# Patient Record
Sex: FEMALE | Race: WHITE | HISPANIC OR LATINO | Employment: UNEMPLOYED | URBAN - METROPOLITAN AREA
[De-identification: names, ages, dates, MRNs, and addresses within clinical notes are randomized per-mention and may not be internally consistent; named-entity substitution may affect disease eponyms.]

---

## 2018-05-24 ENCOUNTER — NON-PROVIDER VISIT (OUTPATIENT)
Dept: OBGYN | Facility: CLINIC | Age: 33
End: 2018-05-24
Payer: COMMERCIAL

## 2018-05-24 DIAGNOSIS — Z32.01 PREGNANCY EXAMINATION OR TEST, POSITIVE RESULT: ICD-10-CM

## 2018-05-24 LAB
INT CON NEG: NEGATIVE
INT CON POS: POSITIVE
POC URINE PREGNANCY TEST: POSITIVE

## 2018-05-24 PROCEDURE — 81025 URINE PREGNANCY TEST: CPT | Performed by: OBSTETRICS & GYNECOLOGY

## 2018-08-21 ENCOUNTER — HOSPITAL ENCOUNTER (OUTPATIENT)
Facility: MEDICAL CENTER | Age: 33
End: 2018-08-21
Attending: OBSTETRICS & GYNECOLOGY
Payer: COMMERCIAL

## 2018-08-21 ENCOUNTER — GYNECOLOGY VISIT (OUTPATIENT)
Dept: OBGYN | Facility: CLINIC | Age: 33
End: 2018-08-21
Payer: COMMERCIAL

## 2018-08-21 VITALS — WEIGHT: 132 LBS | DIASTOLIC BLOOD PRESSURE: 64 MMHG | BODY MASS INDEX: 28.56 KG/M2 | SYSTOLIC BLOOD PRESSURE: 108 MMHG

## 2018-08-21 DIAGNOSIS — Z12.4 SCREENING FOR CERVICAL CANCER: ICD-10-CM

## 2018-08-21 DIAGNOSIS — N93.8 DUB (DYSFUNCTIONAL UTERINE BLEEDING): ICD-10-CM

## 2018-08-21 DIAGNOSIS — Z11.51 SCREENING FOR HPV (HUMAN PAPILLOMAVIRUS): ICD-10-CM

## 2018-08-21 DIAGNOSIS — Z33.1 PREGNANT STATE, INCIDENTAL: ICD-10-CM

## 2018-08-21 PROCEDURE — 99214 OFFICE O/P EST MOD 30 MIN: CPT | Mod: 25 | Performed by: OBSTETRICS & GYNECOLOGY

## 2018-08-21 PROCEDURE — 87624 HPV HI-RISK TYP POOLED RSLT: CPT

## 2018-08-21 PROCEDURE — 87591 N.GONORRHOEAE DNA AMP PROB: CPT

## 2018-08-21 PROCEDURE — 76830 TRANSVAGINAL US NON-OB: CPT | Performed by: OBSTETRICS & GYNECOLOGY

## 2018-08-21 PROCEDURE — 87491 CHLMYD TRACH DNA AMP PROBE: CPT

## 2018-08-21 PROCEDURE — 88175 CYTOPATH C/V AUTO FLUID REDO: CPT

## 2018-08-21 NOTE — PROGRESS NOTES
Chief complaint;  This patient is a 32 y.o. female , Patient's last menstrual period was 2018. presents complaining of dysfunctional uterine bleeding.    History of class AI GDM with first pregnancy and large baby 8 lbs. 9 oz.    Review of systems-denies; chest pain, shortness of breath, fever, chills, abdominal pain  Past OB history-  OB History    Para Term  AB Living   4 2 2 0 1 2   SAB TAB Ectopic Molar Multiple Live Births   1 0 0     2      # Outcome Date GA Lbr Esdras/2nd Weight Sex Delivery Anes PTL Lv   4 Current            3 Term 16 40w4d  2.995 kg (6 lb 9.6 oz) M Vag-Spont None N MARE   2 Term 08/27/15 40w0d  3.175 kg (7 lb) F Vag-Spont   MARE      Birth Comments: GDM Class A1    1 SAB 2014 7w0d             Birth Comments: NO D&C needed        Past surgical history- History reviewed. No pertinent surgical history.  Past medical history-   Past Medical History:   Diagnosis Date   • Abnormal AFP-increased risk of Down's 2015   • Headache(784.0)    • Miscarriage 2014     Allergies- Vancomycin and Pcn [penicillins]  Present medications-   Outpatient Encounter Prescriptions as of 2018   Medication Sig Dispense Refill   • norethindrone (MICRONOR) 0.35 MG tablet Take 1 Tab by mouth every day. 28 Tab 11   • ibuprofen (MOTRIN) 800 MG Tab Take 1 Tab by mouth every 8 hours as needed (Cramping). 30 Tab 3   • Prenatal MV-Min-Fe Fum-FA-DHA (PRENATAL 1 PO) Take  by mouth.       No facility-administered encounter medications on file as of 2018.      Family history- no history of breast or ovarian cancer  Social history-   Social History     Social History   • Marital status:      Spouse name: N/A   • Number of children: N/A   • Years of education: N/A     Occupational History   • Not on file.     Social History Main Topics   • Smoking status: Never Smoker   • Smokeless tobacco: Not on file   • Alcohol use No   • Drug use: No   • Sexual activity: Yes     Partners:  Male      Comment: Planned pregnancy     Other Topics Concern   • Not on file     Social History Narrative    ** Merged History Encounter **              Physical examination;   Alert and oriented x3  General-well-developed well-nourished female in no apparent distress  Vitals:    08/21/18 1412   BP: 108/64   Weight: 59.9 kg (132 lb)     Skin is warm and dry   HEENT-normocephalic,nontraumatic,PERRLA,EOMI  Throat- no edema or erythema  Neck-supple  Cardiovascular-regular rate and rhythm, normal S1-S2 without murmurs or gallops  Lungs-clear to auscultation bilaterally  Back-negative CVA tenderness  Abdomen-nondistended positive bowel sounds soft nontender without masses or hepatosplenomegaly  Pelvic examination;  External genitalia-without lesions  Vagina-no blood, no discharge  Cervix-closed,no gross lesions  Uterus-  18 weeks size, nontender  Adnexa- without mass or tenderness  Extremities-without cyanosis clubbing or edema  Neurologic-grossly intact    Transvaginal ultrasound; as performed and read by myself; intrauterine gestational sac containing burden pregnancy composite measurements consistent with 18 weeks zero days, EDC 1/22/19 no free pelvic fluid, no adnexal masses    Impression;  Dysfunctional uterine bleeding-no evidence of ectopic or miscarriage    Plan;   SS- too late  Labs today   Patient to followup 4 weeks  Check PN labs AFP one hour GTT      35 Minutes total spent with the patient in face-to-face contact,5 minutes of total time utilized to perform  Ultrasound, greater than 50% of the time has been spent on counseling and coordination of care. Many questions answered regarding possible miscarriage.

## 2018-08-21 NOTE — PROGRESS NOTES
New OB pt, 1st prenatal visit. Pt doing Good, denies any leaking fluids. Pt suffers mild Anxiety.

## 2018-08-21 NOTE — PROGRESS NOTES
Chief complaint;  This patient is a 32 y.o. female , Patient's last menstrual period was 2018. presents complaining of dysfunctional uterine bleeding.    Review of systems-denies; chest pain, shortness of breath, fever, chills, abdominal pain  Past OB history-  OB History    Para Term  AB Living   4 2 2 0 1 2   SAB TAB Ectopic Molar Multiple Live Births   1 0 0     2      # Outcome Date GA Lbr Esdras/2nd Weight Sex Delivery Anes PTL Lv   4 Current            3 Term 16 40w4d  2.995 kg (6 lb 9.6 oz) M Vag-Spont None N MARE   2 Term 08/27/15 40w0d  3.175 kg (7 lb) F Vag-Spont   MARE      Birth Comments: GDM Class A1    1 SAB 2014 7w0d             Birth Comments: NO D&C needed        Past surgical history- History reviewed. No pertinent surgical history.  Past medical history-   Past Medical History:   Diagnosis Date   • Abnormal AFP-increased risk of Down's 2015   • Headache(784.0)    • Miscarriage 2014     Allergies- Vancomycin and Pcn [penicillins]  Present medications-   Outpatient Encounter Prescriptions as of 2018   Medication Sig Dispense Refill   • norethindrone (MICRONOR) 0.35 MG tablet Take 1 Tab by mouth every day. 28 Tab 11   • ibuprofen (MOTRIN) 800 MG Tab Take 1 Tab by mouth every 8 hours as needed (Cramping). 30 Tab 3   • Prenatal MV-Min-Fe Fum-FA-DHA (PRENATAL 1 PO) Take  by mouth.       No facility-administered encounter medications on file as of 2018.      Family history- no history of breast or ovarian cancer  Social history-   Social History     Social History   • Marital status:      Spouse name: N/A   • Number of children: N/A   • Years of education: N/A     Occupational History   • Not on file.     Social History Main Topics   • Smoking status: Never Smoker   • Smokeless tobacco: Not on file   • Alcohol use No   • Drug use: No   • Sexual activity: Yes     Partners: Male      Comment: Planned pregnancy     Other Topics Concern   • Not on file      Social History Narrative    ** Merged History Encounter **              Physical examination;   Alert and oriented x3  General-well-developed well-nourished female in no apparent distress  There were no vitals filed for this visit.  Skin is warm and dry   HEENT-normocephalic,nontraumatic,PERRLA,EOMI  Throat- no edema or erythema  Neck-supple  Cardiovascular-regular rate and rhythm, normal S1-S2 without murmurs or gallops  Lungs-clear to auscultation bilaterally  Back-negative CVA tenderness  Abdomen-nondistended positive bowel sounds soft nontender without masses or hepatosplenomegaly  Pelvic examination;  External genitalia-without lesions  Vagina-no blood, no discharge  Cervix-closed,no gross lesions  Uterus-  *** weeks size, nontender  Adnexa- without mass or tenderness  Extremities-without cyanosis clubbing or edema  Neurologic-grossly intact    Transvaginal ultrasound; as performed and read by myself; ***    Impression;  Dysfunctional uterine bleeding- ***    Plan;   SS- ***  Labs today   Patient to followup ***      *** Minutes total spent with the patient in face-to-face contact,5 minutes of total time utilized to perform  Ultrasound, greater than 50% of the time has been spent on counseling and coordination of care. Many questions answered regarding possible miscarriage.

## 2018-08-22 LAB
C TRACH DNA GENITAL QL NAA+PROBE: NEGATIVE
CYTOLOGY REG CYTOL: NORMAL
HPV HR 12 DNA CVX QL NAA+PROBE: NEGATIVE
HPV16 DNA SPEC QL NAA+PROBE: NEGATIVE
HPV18 DNA SPEC QL NAA+PROBE: NEGATIVE
N GONORRHOEA DNA GENITAL QL NAA+PROBE: NEGATIVE
SPECIMEN SOURCE: NORMAL
SPECIMEN SOURCE: NORMAL

## 2018-10-02 ENCOUNTER — HOSPITAL ENCOUNTER (OUTPATIENT)
Dept: LAB | Facility: MEDICAL CENTER | Age: 33
End: 2018-10-02
Attending: OBSTETRICS & GYNECOLOGY
Payer: COMMERCIAL

## 2018-10-02 DIAGNOSIS — N93.8 DUB (DYSFUNCTIONAL UTERINE BLEEDING): ICD-10-CM

## 2018-10-02 LAB
ABO GROUP BLD: NORMAL
BACTERIA #/AREA URNS HPF: ABNORMAL /HPF
BLD GP AB SCN SERPL QL: NORMAL
EPI CELLS #/AREA URNS HPF: ABNORMAL /HPF
HYALINE CASTS #/AREA URNS LPF: ABNORMAL /LPF
RBC # URNS HPF: ABNORMAL /HPF
RH BLD: NORMAL
WBC #/AREA URNS HPF: ABNORMAL /HPF

## 2018-10-02 PROCEDURE — 87389 HIV-1 AG W/HIV-1&-2 AB AG IA: CPT

## 2018-10-02 PROCEDURE — 86900 BLOOD TYPING SEROLOGIC ABO: CPT

## 2018-10-02 PROCEDURE — 81511 FTL CGEN ABNOR FOUR ANAL: CPT

## 2018-10-02 PROCEDURE — 86762 RUBELLA ANTIBODY: CPT

## 2018-10-02 PROCEDURE — 85025 COMPLETE CBC W/AUTO DIFF WBC: CPT

## 2018-10-02 PROCEDURE — 86850 RBC ANTIBODY SCREEN: CPT

## 2018-10-02 PROCEDURE — 86901 BLOOD TYPING SEROLOGIC RH(D): CPT

## 2018-10-02 PROCEDURE — 36415 COLL VENOUS BLD VENIPUNCTURE: CPT

## 2018-10-02 PROCEDURE — 87340 HEPATITIS B SURFACE AG IA: CPT

## 2018-10-02 PROCEDURE — 86780 TREPONEMA PALLIDUM: CPT

## 2018-10-02 PROCEDURE — 81001 URINALYSIS AUTO W/SCOPE: CPT

## 2018-10-03 LAB
APPEARANCE UR: ABNORMAL
BASOPHILS # BLD AUTO: 0.3 % (ref 0–1.8)
BASOPHILS # BLD: 0.03 K/UL (ref 0–0.12)
BILIRUB UR QL STRIP.AUTO: NEGATIVE
COLOR UR: YELLOW
EOSINOPHIL # BLD AUTO: 0.03 K/UL (ref 0–0.51)
EOSINOPHIL NFR BLD: 0.3 % (ref 0–6.9)
ERYTHROCYTE [DISTWIDTH] IN BLOOD BY AUTOMATED COUNT: 45.3 FL (ref 35.9–50)
GLUCOSE UR STRIP.AUTO-MCNC: NEGATIVE MG/DL
HBV SURFACE AG SER QL: NEGATIVE
HCT VFR BLD AUTO: 39.5 % (ref 37–47)
HGB BLD-MCNC: 13.1 G/DL (ref 12–16)
HIV 1+2 AB+HIV1 P24 AG SERPL QL IA: NON REACTIVE
IMM GRANULOCYTES # BLD AUTO: 0.05 K/UL (ref 0–0.11)
IMM GRANULOCYTES NFR BLD AUTO: 0.6 % (ref 0–0.9)
KETONES UR STRIP.AUTO-MCNC: 15 MG/DL
LEUKOCYTE ESTERASE UR QL STRIP.AUTO: ABNORMAL
LYMPHOCYTES # BLD AUTO: 1.99 K/UL (ref 1–4.8)
LYMPHOCYTES NFR BLD: 22.2 % (ref 22–41)
MCH RBC QN AUTO: 32.5 PG (ref 27–33)
MCHC RBC AUTO-ENTMCNC: 33.2 G/DL (ref 33.6–35)
MCV RBC AUTO: 98 FL (ref 81.4–97.8)
MICRO URNS: ABNORMAL
MONOCYTES # BLD AUTO: 0.67 K/UL (ref 0–0.85)
MONOCYTES NFR BLD AUTO: 7.5 % (ref 0–13.4)
NEUTROPHILS # BLD AUTO: 6.19 K/UL (ref 2–7.15)
NEUTROPHILS NFR BLD: 69.1 % (ref 44–72)
NITRITE UR QL STRIP.AUTO: NEGATIVE
NRBC # BLD AUTO: 0 K/UL
NRBC BLD-RTO: 0 /100 WBC
PH UR STRIP.AUTO: 6.5 [PH]
PLATELET # BLD AUTO: 241 K/UL (ref 164–446)
PMV BLD AUTO: 10.4 FL (ref 9–12.9)
PROT UR QL STRIP: NEGATIVE MG/DL
RBC # BLD AUTO: 4.03 M/UL (ref 4.2–5.4)
RBC UR QL AUTO: NEGATIVE
RUBV AB SER QL: 461.5 IU/ML
SP GR UR STRIP.AUTO: 1.01
TREPONEMA PALLIDUM IGG+IGM AB [PRESENCE] IN SERUM OR PLASMA BY IMMUNOASSAY: NON REACTIVE
UROBILINOGEN UR STRIP.AUTO-MCNC: 0.2 MG/DL
WBC # BLD AUTO: 9 K/UL (ref 4.8–10.8)

## 2018-10-04 LAB
# FETUSES US: NORMAL
AFP MOM SERPL: 2.04
AFP SERPL-MCNC: 244 NG/ML
AGE - REPORTED: 33.2 YR
CURRENT SMOKER: NO
FAMILY MEMBER DISEASES HX: NO
GA METHOD: NORMAL
GA: NORMAL WK
HCG MOM SERPL: 0.58
HCG SERPL-ACNC: NORMAL IU/L
HX OF HEREDITARY DISORDERS: NO
IDDM PATIENT QL: NO
INHIBIN A MOM SERPL: 0.62
INHIBIN A SERPL-MCNC: 188 PG/ML
INTEGRATED SCN PATIENT-IMP: NORMAL
PATHOLOGY STUDY: NORMAL
SPECIMEN DRAWN SERPL: NORMAL
U ESTRIOL MOM SERPL: 1.25
U ESTRIOL SERPL-MCNC: 4.52 NG/ML

## 2018-10-12 ENCOUNTER — INITIAL PRENATAL (OUTPATIENT)
Dept: OBGYN | Facility: MEDICAL CENTER | Age: 33
End: 2018-10-12
Payer: COMMERCIAL

## 2018-10-12 VITALS
DIASTOLIC BLOOD PRESSURE: 60 MMHG | SYSTOLIC BLOOD PRESSURE: 100 MMHG | BODY MASS INDEX: 30.2 KG/M2 | WEIGHT: 140 LBS | HEIGHT: 57 IN

## 2018-10-12 DIAGNOSIS — Z34.82 ENCOUNTER FOR SUPERVISION OF OTHER NORMAL PREGNANCY IN SECOND TRIMESTER: ICD-10-CM

## 2018-10-12 PROBLEM — Z34.92 SUPERVISION OF NORMAL PREGNANCY IN SECOND TRIMESTER: Status: ACTIVE | Noted: 2018-10-12

## 2018-10-12 PROCEDURE — 90040 PR PRENATAL FOLLOW UP: CPT | Performed by: OBSTETRICS & GYNECOLOGY

## 2018-10-12 NOTE — PROGRESS NOTES
Pt. Here for NOB visit today.  # [phone]  Pt. States no complaints  Pharmacy verified  AFP 10/2018 WNL

## 2018-10-12 NOTE — PROGRESS NOTES
32 y.o.  25w6d The patient is here for routine obstetrical followup. She reports good fetal movement. She denies contractions, vaginal bleeding, or leaking of fluid.    The patient's pregnancy is complicated by   Patient Active Problem List    Diagnosis Date Noted   • Postpartum care following vaginal delivery 2016     Prenatal labs, pap, afp reviewed - normal  Fetal survey ordered.  28 wk labs ordered.      Followup in 4 weeks   labor precautions were discussed with patient  Fetal kick counts were discussed with patient

## 2018-10-30 ENCOUNTER — HOSPITAL ENCOUNTER (OUTPATIENT)
Dept: RADIOLOGY | Facility: MEDICAL CENTER | Age: 33
End: 2018-10-30
Attending: OBSTETRICS & GYNECOLOGY
Payer: COMMERCIAL

## 2018-10-30 ENCOUNTER — HOSPITAL ENCOUNTER (OUTPATIENT)
Dept: LAB | Facility: MEDICAL CENTER | Age: 33
End: 2018-10-30
Attending: OBSTETRICS & GYNECOLOGY
Payer: COMMERCIAL

## 2018-10-30 DIAGNOSIS — Z34.82 ENCOUNTER FOR SUPERVISION OF OTHER NORMAL PREGNANCY IN SECOND TRIMESTER: ICD-10-CM

## 2018-10-30 LAB
BASOPHILS # BLD AUTO: 0.3 % (ref 0–1.8)
BASOPHILS # BLD: 0.02 K/UL (ref 0–0.12)
EOSINOPHIL # BLD AUTO: 0.02 K/UL (ref 0–0.51)
EOSINOPHIL NFR BLD: 0.3 % (ref 0–6.9)
ERYTHROCYTE [DISTWIDTH] IN BLOOD BY AUTOMATED COUNT: 45.6 FL (ref 35.9–50)
GLUCOSE 1H P 50 G GLC PO SERPL-MCNC: 174 MG/DL (ref 70–139)
HCT VFR BLD AUTO: 36.4 % (ref 37–47)
HGB BLD-MCNC: 12.1 G/DL (ref 12–16)
IMM GRANULOCYTES # BLD AUTO: 0.03 K/UL (ref 0–0.11)
IMM GRANULOCYTES NFR BLD AUTO: 0.4 % (ref 0–0.9)
LYMPHOCYTES # BLD AUTO: 1.41 K/UL (ref 1–4.8)
LYMPHOCYTES NFR BLD: 18.3 % (ref 22–41)
MCH RBC QN AUTO: 32.7 PG (ref 27–33)
MCHC RBC AUTO-ENTMCNC: 33.2 G/DL (ref 33.6–35)
MCV RBC AUTO: 98.4 FL (ref 81.4–97.8)
MONOCYTES # BLD AUTO: 0.54 K/UL (ref 0–0.85)
MONOCYTES NFR BLD AUTO: 7 % (ref 0–13.4)
NEUTROPHILS # BLD AUTO: 5.7 K/UL (ref 2–7.15)
NEUTROPHILS NFR BLD: 73.7 % (ref 44–72)
NRBC # BLD AUTO: 0 K/UL
NRBC BLD-RTO: 0 /100 WBC
PLATELET # BLD AUTO: 219 K/UL (ref 164–446)
PMV BLD AUTO: 10.3 FL (ref 9–12.9)
RBC # BLD AUTO: 3.7 M/UL (ref 4.2–5.4)
WBC # BLD AUTO: 7.7 K/UL (ref 4.8–10.8)

## 2018-10-30 PROCEDURE — 82950 GLUCOSE TEST: CPT

## 2018-10-30 PROCEDURE — 36415 COLL VENOUS BLD VENIPUNCTURE: CPT

## 2018-10-30 PROCEDURE — 86780 TREPONEMA PALLIDUM: CPT

## 2018-10-30 PROCEDURE — 76805 OB US >/= 14 WKS SNGL FETUS: CPT

## 2018-10-30 PROCEDURE — 85025 COMPLETE CBC W/AUTO DIFF WBC: CPT

## 2018-10-31 DIAGNOSIS — R73.09 GLUCOSE TOLERANCE TEST ABNORMAL: ICD-10-CM

## 2018-10-31 LAB — TREPONEMA PALLIDUM IGG+IGM AB [PRESENCE] IN SERUM OR PLASMA BY IMMUNOASSAY: NON REACTIVE

## 2018-11-01 ENCOUNTER — DATING (OUTPATIENT)
Dept: OBGYN | Facility: MEDICAL CENTER | Age: 33
End: 2018-11-01

## 2018-11-05 ENCOUNTER — TELEPHONE (OUTPATIENT)
Dept: OBGYN | Facility: CLINIC | Age: 33
End: 2018-11-05

## 2018-11-05 NOTE — TELEPHONE ENCOUNTER
----- Message from Alexia Cagle M.D. sent at 10/31/2018  8:33 AM PDT -----  Please inform patient that her glucose test returned abnormal.  I have placed an order for a 3 hour GTT.  Please have patient complete this test asap.  She will need to be fasting.

## 2018-11-09 ENCOUNTER — ROUTINE PRENATAL (OUTPATIENT)
Dept: OBGYN | Facility: CLINIC | Age: 33
End: 2018-11-09
Payer: COMMERCIAL

## 2018-11-09 VITALS — DIASTOLIC BLOOD PRESSURE: 60 MMHG | BODY MASS INDEX: 30.25 KG/M2 | WEIGHT: 139.8 LBS | SYSTOLIC BLOOD PRESSURE: 110 MMHG

## 2018-11-09 DIAGNOSIS — Z34.83 ENCOUNTER FOR SUPERVISION OF OTHER NORMAL PREGNANCY, THIRD TRIMESTER: ICD-10-CM

## 2018-11-09 DIAGNOSIS — O99.810 ABNORMAL GLUCOSE TOLERANCE IN PREGNANCY: ICD-10-CM

## 2018-11-09 PROCEDURE — 90040 PR PRENATAL FOLLOW UP: CPT | Performed by: OBSTETRICS & GYNECOLOGY

## 2018-11-09 NOTE — LETTER
"Count Your Baby's Movements  Another step to a healthy delivery        How Many Weeks Pregnant? 29w3d  Date to Begin Countin2018              How to use this chart    One way for your physician to keep track of your baby's health is by knowing how often the baby moves (or \"kicks\") in your womb.  You can help your physician to do this by using this chart every day.    Every day, you should see how many hours it takes for your baby to move 10 times.  Start in the morning, as soon as you get up.    · First, write down the time your baby moves until you get to 10.  · Check off one box every time your baby moves until you get to 10.  · Write down the time you finished counting in the last column.  · Total how long it took to count up all 10 movements.  · Finally, fill in the box that shows how long this took.  After counting 10 movements, you no longer have to count any more that day.  The next morning, just start counting again as soon as you get up.    What should you call a \"movement\"?  It is hard to say, because it will feel different from one mother to another and from one pregnancy to the next.  The important thing is that you count the movements the same way throughout your pregnancy.  If you have more questions, you should ask your physician.    Count carefully every day!  SAMPLE:  Week 28    How many hours did it take to feel 10 movements?       Start  Time     1     2     3     4     5     6     7     8     9     10   Finish Time   Mon 8:20 ·  ·  ·  ·  ·  ·  ·  ·  ·  ·  11:40                  Fri               Sat               Sun                 IMPORTANT: You should contact your physician if it takes more than two hours for you to feel 10 movements.  Each morning, write down the time and start to count the movements of your baby.  Keep track by checking off one box every time you feel one movement.  When you have felt 10 \"kicks\", write down the time you " finished counting in the last column.  Then fill in the   box (over the check laith) for the number of hours it took.  Be sure to read the complete instructions on the previous page.

## 2018-11-09 NOTE — NON-PROVIDER
Patient here today for OB follow up @ 75rmg8l  States good FM, denies VB and LOF.  C/O: None  Abn 1 hr gtt, needs 3 hr  Pharm verified   Good # [phone]

## 2018-11-09 NOTE — PROGRESS NOTES
S: Pt presents for routine OB follow up. Reports Good fetal movements and kick counts.  No contractions, vaginal bleeding, or leakage of fluid.    Questions answered.    O: /60   Wt 63.4 kg (139 lb 12.8 oz)   LMP 2018   BMI 30.25 kg/m²   Patients' weight gain, fluid intake and exercise level discussed.  Vitals, fundal height , fetal position, and FHR reviewed on flowsheet    Lab:  Recent Results (from the past 336 hour(s))   GLUCOSE 1HR GESTATIONAL    Collection Time: 10/30/18  9:50 AM   Result Value Ref Range    Glucose, Post Dose 174 (H) 70 - 139 mg/dL   CBC WITH DIFFERENTIAL    Collection Time: 10/30/18  9:50 AM   Result Value Ref Range    WBC 7.7 4.8 - 10.8 K/uL    RBC 3.70 (L) 4.20 - 5.40 M/uL    Hemoglobin 12.1 12.0 - 16.0 g/dL    Hematocrit 36.4 (L) 37.0 - 47.0 %    MCV 98.4 (H) 81.4 - 97.8 fL    MCH 32.7 27.0 - 33.0 pg    MCHC 33.2 (L) 33.6 - 35.0 g/dL    RDW 45.6 35.9 - 50.0 fL    Platelet Count 219 164 - 446 K/uL    MPV 10.3 9.0 - 12.9 fL    Neutrophils-Polys 73.70 (H) 44.00 - 72.00 %    Lymphocytes 18.30 (L) 22.00 - 41.00 %    Monocytes 7.00 0.00 - 13.40 %    Eosinophils 0.30 0.00 - 6.90 %    Basophils 0.30 0.00 - 1.80 %    Immature Granulocytes 0.40 0.00 - 0.90 %    Nucleated RBC 0.00 /100 WBC    Neutrophils (Absolute) 5.70 2.00 - 7.15 K/uL    Lymphs (Absolute) 1.41 1.00 - 4.80 K/uL    Monos (Absolute) 0.54 0.00 - 0.85 K/uL    Eos (Absolute) 0.02 0.00 - 0.51 K/uL    Baso (Absolute) 0.02 0.00 - 0.12 K/uL    Immature Granulocytes (abs) 0.03 0.00 - 0.11 K/uL    NRBC (Absolute) 0.00 K/uL   T.PALLIDUM AB EIA    Collection Time: 10/30/18  9:50 AM   Result Value Ref Range    Syphilis, Treponemal Qual Non Reactive Non Reactive       A/P:  32 y.o.  at 29w6d presents for routine obstetric follow-up.  Size equals dates.  Prenatal labs were reviewed.  Abnormal 1 hour GCT was discussed the need for 3-hour GTT was reviewed.  3-hour GTT was ordered    1.  Continue prenatal vitamins.  2.  Continue  daily fetal kick counts.  3.  Exercise at least 30 minutes daily.  4.  Increase water intake.  5.  Pregnancy and  labor precautions were reviewed  6.  Follow-up in 2 weeks.  7.  Precautions and plan of care were discussed

## 2018-11-29 ENCOUNTER — APPOINTMENT (OUTPATIENT)
Dept: OBGYN | Facility: CLINIC | Age: 33
End: 2018-11-29

## 2018-12-07 ENCOUNTER — HOSPITAL ENCOUNTER (OUTPATIENT)
Dept: LAB | Facility: MEDICAL CENTER | Age: 33
End: 2018-12-07
Attending: OBSTETRICS & GYNECOLOGY
Payer: COMMERCIAL

## 2018-12-07 DIAGNOSIS — R73.09 GLUCOSE TOLERANCE TEST ABNORMAL: ICD-10-CM

## 2018-12-07 LAB
GLUCOSE 1H P CHAL SERPL-MCNC: 190 MG/DL (ref 65–180)
GLUCOSE 2H P CHAL SERPL-MCNC: 191 MG/DL (ref 65–155)
GLUCOSE 3H P CHAL SERPL-MCNC: 150 MG/DL (ref 65–140)
GLUCOSE BS SERPL-MCNC: 67 MG/DL (ref 65–95)

## 2018-12-07 PROCEDURE — 82951 GLUCOSE TOLERANCE TEST (GTT): CPT

## 2018-12-07 PROCEDURE — 82952 GTT-ADDED SAMPLES: CPT

## 2018-12-07 PROCEDURE — 36415 COLL VENOUS BLD VENIPUNCTURE: CPT

## 2018-12-10 DIAGNOSIS — O24.419 GESTATIONAL DIABETES MELLITUS (GDM) IN THIRD TRIMESTER, GESTATIONAL DIABETES METHOD OF CONTROL UNSPECIFIED: ICD-10-CM

## 2018-12-11 ENCOUNTER — ROUTINE PRENATAL (OUTPATIENT)
Dept: OBGYN | Facility: CLINIC | Age: 33
End: 2018-12-11
Payer: COMMERCIAL

## 2018-12-11 VITALS — DIASTOLIC BLOOD PRESSURE: 66 MMHG | WEIGHT: 140 LBS | BODY MASS INDEX: 30.3 KG/M2 | SYSTOLIC BLOOD PRESSURE: 114 MMHG

## 2018-12-11 DIAGNOSIS — O24.419 GESTATIONAL DIABETES MELLITUS (GDM), ANTEPARTUM, GESTATIONAL DIABETES METHOD OF CONTROL UNSPECIFIED: ICD-10-CM

## 2018-12-11 DIAGNOSIS — Z34.83 ENCOUNTER FOR SUPERVISION OF OTHER NORMAL PREGNANCY, THIRD TRIMESTER: ICD-10-CM

## 2018-12-11 PROCEDURE — 90040 PR PRENATAL FOLLOW UP: CPT | Performed by: OBSTETRICS & GYNECOLOGY

## 2018-12-11 NOTE — PROGRESS NOTES
S: Pt presents for routine OB follow up. Reports Good fetal movements/normal kick counts.  No contractions, vaginal bleeding, or leakage of fluid.    Questions answered.    O: /66   Wt 63.5 kg (140 lb)   LMP 2018   BMI 30.30 kg/m²   Patients' weight gain, fluid intake and exercise level discussed.  Vitals, fundal height , fetal position, and FHR reviewed on flowsheet    Lab:  Recent Results (from the past 336 hour(s))   GLUCOSE 3 HR GESTATIONAL    Collection Time: 18  6:46 AM   Result Value Ref Range    Glucose 3 Hour 150 (H) 65 - 140 mg/dL    Glucose 2 Hour 191 (H) 65 - 155 mg/dL    Baseline Glucose 67 65 - 95 mg/dL    Glucose 1 Hour 190 (H) 65 - 180 mg/dL       A/P:  33 y.o.  at 34w3d presents for routine obstetric follow-up.  Size equals dates   Patient is a new gestational diabetic.  Results were reviewed.  Counseled on diet.  Diabetic education appointment is pending    1.  Continue prenatal vitamins.  2.  Fetal kick counts daily discussed.  3.  Exercise at least 30 minutes daily.  4.  Drink at least 2L of water daily  5.  Pregnancy and  labor precautions reviewed  6.  Follow-up in 1 weeks.  7.  GBS at follow-up visit discussed

## 2018-12-11 NOTE — PROGRESS NOTES
Ob follow up. Good fetal movement. Pt has no complaints.   Phone # & pharmacy verified.  TDAP NEXT VISIT

## 2018-12-14 ENCOUNTER — TELEPHONE (OUTPATIENT)
Dept: OBGYN | Facility: MEDICAL CENTER | Age: 33
End: 2018-12-14

## 2018-12-14 NOTE — TELEPHONE ENCOUNTER
----- Message from Alexia Cagle M.D. sent at 12/10/2018  9:31 AM PST -----  Please inform patient that she failed her 3 hour GTT.  I have placed a referral for diabetic education ASAP.

## 2018-12-14 NOTE — TELEPHONE ENCOUNTER
Left detailed message for pt with elevated 3hr gtt results.Advised her of referral to [facility] for diet teaching and glucomoter testing.L/m to return tc with any questions.emily

## 2018-12-26 ENCOUNTER — HOSPITAL ENCOUNTER (OUTPATIENT)
Facility: MEDICAL CENTER | Age: 33
End: 2018-12-26
Attending: OBSTETRICS & GYNECOLOGY
Payer: COMMERCIAL

## 2018-12-26 ENCOUNTER — ROUTINE PRENATAL (OUTPATIENT)
Dept: OBGYN | Facility: CLINIC | Age: 33
End: 2018-12-26
Payer: COMMERCIAL

## 2018-12-26 VITALS — WEIGHT: 141 LBS | BODY MASS INDEX: 30.51 KG/M2 | SYSTOLIC BLOOD PRESSURE: 112 MMHG | DIASTOLIC BLOOD PRESSURE: 66 MMHG

## 2018-12-26 DIAGNOSIS — O24.410 DIET CONTROLLED GESTATIONAL DIABETES MELLITUS (GDM), ANTEPARTUM: ICD-10-CM

## 2018-12-26 DIAGNOSIS — O09.93 HIGH-RISK PREGNANCY SUPERVISION, THIRD TRIMESTER: ICD-10-CM

## 2018-12-26 DIAGNOSIS — O09.93 HIGH-RISK PREGNANCY SUPERVISION, THIRD TRIMESTER: Primary | ICD-10-CM

## 2018-12-26 PROCEDURE — 87081 CULTURE SCREEN ONLY: CPT

## 2018-12-26 PROCEDURE — 87150 DNA/RNA AMPLIFIED PROBE: CPT

## 2018-12-26 PROCEDURE — 90040 PR PRENATAL FOLLOW UP: CPT | Performed by: OBSTETRICS & GYNECOLOGY

## 2018-12-26 NOTE — PROGRESS NOTES
S: Pt presents for routine OB follow up. Reports good fetal movements.  No contractions, vaginal bleeding, or leakage of fluid.  Patient is a new gestational diabetic.  Patient states she was called for diabetes teaching but did not go for her appointment.  I discussed need for diabetes teaching to start fingersticks 4 times daily.  I discussed need for compliance with care and risk of uncontrolled diabetes including risk for stillbirth.  Questions answered.    O: LMP 2018   Patients' weight gain, fluid intake and exercise level discussed.  Vitals, fundal height , fetal position, and FHR reviewed on flowsheet    Lab:No results found for this or any previous visit (from the past 336 hour(s)).    A/P:  33 y.o.  at 36w4d presents for routine obstetric follow-up.  Size equals dates   Encounter Diagnoses   Name Primary?   • High-risk pregnancy supervision, third trimester    • Diet controlled gestational diabetes mellitus (GDM), antepartum          1.  Continue prenatal vitamins.  2.  Fetal kick counts daily discussed.  3.  Exercise at least 30 minutes daily.  4.  Drink at least 2L of water daily  5. Pregnancy and  Labor precautions reviewed.  6.  Follow-up in 1 week.  7.  GBS done today  8.  Patient encouraged to go for diabetes teaching and start 4 times daily fingersticks  9.  Precautions and plan of care were reviewed

## 2018-12-27 LAB — GP B STREP DNA SPEC QL NAA+PROBE: NEGATIVE

## 2019-01-09 ENCOUNTER — ROUTINE PRENATAL (OUTPATIENT)
Dept: OBGYN | Facility: CLINIC | Age: 34
End: 2019-01-09
Payer: COMMERCIAL

## 2019-01-09 VITALS — DIASTOLIC BLOOD PRESSURE: 70 MMHG | WEIGHT: 142 LBS | SYSTOLIC BLOOD PRESSURE: 114 MMHG | BODY MASS INDEX: 30.73 KG/M2

## 2019-01-09 DIAGNOSIS — Z34.83 PRENATAL CARE, SUBSEQUENT PREGNANCY IN THIRD TRIMESTER: ICD-10-CM

## 2019-01-09 DIAGNOSIS — Z34.90 TERM PREGNANCY: ICD-10-CM

## 2019-01-09 PROCEDURE — 90715 TDAP VACCINE 7 YRS/> IM: CPT | Performed by: OBSTETRICS & GYNECOLOGY

## 2019-01-09 PROCEDURE — 90471 IMMUNIZATION ADMIN: CPT | Performed by: OBSTETRICS & GYNECOLOGY

## 2019-01-09 PROCEDURE — 90040 PR PRENATAL FOLLOW UP: CPT | Performed by: OBSTETRICS & GYNECOLOGY

## 2019-01-09 NOTE — PROGRESS NOTES
GDM A1 Ob follow up. Good fetal movement. Pt has no complaints.   Phone # & pharmacy verified.  GBS = Negative

## 2019-01-09 NOTE — PROGRESS NOTES
FLO:  38w4d    Pt reports doing well.  Denies vaginal bleeding, contractions, LOF.  Reports +FM.    CBGS:  Fastings: 62-78  PP:     LMP 2018   gen: AAO, NAD  FHTs: 145  FH: 38  SVE: /3    A/P: 33 y.o.  @ 38w4d by lmp c/w 28wk US   #Prenatal care. PNL up to date, Rh+,  RPR WNL; GBS negative  #tdap given today  # FWB.  anatomy US wnl, FH appropriate, FHT + today, continue kick counts  # Labor precautions discussed  #Delivery plan: await spontaneous labor - IOL request sent  #Postpartum planning.  plans to BF  #RTC 1 wk

## 2019-01-10 ENCOUNTER — TELEPHONE (OUTPATIENT)
Dept: OBGYN | Facility: CLINIC | Age: 34
End: 2019-01-10

## 2019-01-10 NOTE — TELEPHONE ENCOUNTER
Pt LM on Vm earlier asking if we were able to move up her IOL to 1/19/19.   Called patient, unable to reach her. TCB    2241 Pt called back, stated doctor had given pt the option of having IOL on 1/19/18, spoke with Marcelle and she will call patient.  Pt notified, verbalized understanding and will wait for Marcelle's call

## 2019-01-17 ENCOUNTER — ROUTINE PRENATAL (OUTPATIENT)
Dept: OBGYN | Facility: CLINIC | Age: 34
End: 2019-01-17
Payer: COMMERCIAL

## 2019-01-17 VITALS — SYSTOLIC BLOOD PRESSURE: 102 MMHG | WEIGHT: 142 LBS | DIASTOLIC BLOOD PRESSURE: 60 MMHG | BODY MASS INDEX: 30.73 KG/M2

## 2019-01-17 DIAGNOSIS — Z34.83 ENCOUNTER FOR SUPERVISION OF OTHER NORMAL PREGNANCY, THIRD TRIMESTER: ICD-10-CM

## 2019-01-17 PROCEDURE — 90040 PR PRENATAL FOLLOW UP: CPT | Performed by: OBSTETRICS & GYNECOLOGY

## 2019-01-17 NOTE — PROGRESS NOTES
Pt here today for OB follow up @ 39w5d  Pt denies vaginal blood or cramps/contrctions  Reports +FM  Good # [phone]  Pharmacy Confirmed.  Pt requested Cervical check

## 2019-01-17 NOTE — PROGRESS NOTES
Diabetic prenatal visit;    39w5d  Patient Active Problem List    Diagnosis Date Noted   • High-risk pregnancy supervision, third trimester 12/26/2018   • Gestational diabetes mellitus (GDM), antepartum 12/11/2018   • Encounter for supervision of other normal pregnancy, third trimester 10/12/2018       The patient presents for prenatal care. She is doing well. Denies contractions leakage of fluid or vaginal bleeding. Having good fetal movement    Vitals:    01/17/19 1005   BP: 102/60           Fasting sugars; under 90  One hour Postprandial sugars; under 130    Size equals dates, normal fetal heart rate      Continue ADA diet      Class A1 GDM-well-controlled    NSTs twice weekly starting at 32 weeks if class AII GDM or worse  Increase fluid hydration to 2 L per day  Discussed proper shoes to be worn her pregnancy  Increase exercise daily walking 30 minutes per day  Delivery by 39 weeks if class AII GDM or worse  Discussed support hose use during pregnancy    Patient requests her induction date moved up to this weekend 3-4 days from now this should be fine request/referral placed with schedulers.    Followup; 1 weeks

## 2019-01-22 ENCOUNTER — APPOINTMENT (OUTPATIENT)
Dept: OBGYN | Facility: MEDICAL CENTER | Age: 34
End: 2019-01-22
Attending: OBSTETRICS & GYNECOLOGY
Payer: COMMERCIAL

## 2019-01-22 ENCOUNTER — HOSPITAL ENCOUNTER (INPATIENT)
Facility: MEDICAL CENTER | Age: 34
LOS: 1 days | End: 2019-01-23
Attending: OBSTETRICS & GYNECOLOGY | Admitting: OBSTETRICS & GYNECOLOGY
Payer: COMMERCIAL

## 2019-01-22 LAB
BASOPHILS # BLD AUTO: 0.3 % (ref 0–1.8)
BASOPHILS # BLD: 0.02 K/UL (ref 0–0.12)
EOSINOPHIL # BLD AUTO: 0.01 K/UL (ref 0–0.51)
EOSINOPHIL NFR BLD: 0.1 % (ref 0–6.9)
ERYTHROCYTE [DISTWIDTH] IN BLOOD BY AUTOMATED COUNT: 45.6 FL (ref 35.9–50)
GLUCOSE BLD-MCNC: 68 MG/DL (ref 65–99)
HCT VFR BLD AUTO: 40 % (ref 37–47)
HGB BLD-MCNC: 13.3 G/DL (ref 12–16)
HOLDING TUBE BB 8507: NORMAL
IMM GRANULOCYTES # BLD AUTO: 0.02 K/UL (ref 0–0.11)
IMM GRANULOCYTES NFR BLD AUTO: 0.3 % (ref 0–0.9)
LYMPHOCYTES # BLD AUTO: 1.4 K/UL (ref 1–4.8)
LYMPHOCYTES NFR BLD: 19.1 % (ref 22–41)
MCH RBC QN AUTO: 32.1 PG (ref 27–33)
MCHC RBC AUTO-ENTMCNC: 33.3 G/DL (ref 33.6–35)
MCV RBC AUTO: 96.6 FL (ref 81.4–97.8)
MONOCYTES # BLD AUTO: 0.59 K/UL (ref 0–0.85)
MONOCYTES NFR BLD AUTO: 8 % (ref 0–13.4)
NEUTROPHILS # BLD AUTO: 5.3 K/UL (ref 2–7.15)
NEUTROPHILS NFR BLD: 72.2 % (ref 44–72)
NRBC # BLD AUTO: 0 K/UL
NRBC BLD-RTO: 0 /100 WBC
PLATELET # BLD AUTO: 202 K/UL (ref 164–446)
PMV BLD AUTO: 10.8 FL (ref 9–12.9)
RBC # BLD AUTO: 4.14 M/UL (ref 4.2–5.4)
WBC # BLD AUTO: 7.3 K/UL (ref 4.8–10.8)

## 2019-01-22 PROCEDURE — 10907ZC DRAINAGE OF AMNIOTIC FLUID, THERAPEUTIC FROM PRODUCTS OF CONCEPTION, VIA NATURAL OR ARTIFICIAL OPENING: ICD-10-PCS | Performed by: OBSTETRICS & GYNECOLOGY

## 2019-01-22 PROCEDURE — 700111 HCHG RX REV CODE 636 W/ 250 OVERRIDE (IP)

## 2019-01-22 PROCEDURE — A9270 NON-COVERED ITEM OR SERVICE: HCPCS | Performed by: OBSTETRICS & GYNECOLOGY

## 2019-01-22 PROCEDURE — 700111 HCHG RX REV CODE 636 W/ 250 OVERRIDE (IP): Performed by: OBSTETRICS & GYNECOLOGY

## 2019-01-22 PROCEDURE — 36415 COLL VENOUS BLD VENIPUNCTURE: CPT

## 2019-01-22 PROCEDURE — 770002 HCHG ROOM/CARE - OB PRIVATE (112)

## 2019-01-22 PROCEDURE — 82962 GLUCOSE BLOOD TEST: CPT

## 2019-01-22 PROCEDURE — 304965 HCHG RECOVERY SERVICES

## 2019-01-22 PROCEDURE — 700102 HCHG RX REV CODE 250 W/ 637 OVERRIDE(OP): Performed by: OBSTETRICS & GYNECOLOGY

## 2019-01-22 PROCEDURE — 59410 OBSTETRICAL CARE: CPT | Performed by: OBSTETRICS & GYNECOLOGY

## 2019-01-22 PROCEDURE — 59409 OBSTETRICAL CARE: CPT

## 2019-01-22 PROCEDURE — 85025 COMPLETE CBC W/AUTO DIFF WBC: CPT

## 2019-01-22 PROCEDURE — 3E033VJ INTRODUCTION OF OTHER HORMONE INTO PERIPHERAL VEIN, PERCUTANEOUS APPROACH: ICD-10-PCS | Performed by: OBSTETRICS & GYNECOLOGY

## 2019-01-22 PROCEDURE — 700105 HCHG RX REV CODE 258

## 2019-01-22 PROCEDURE — 700111 HCHG RX REV CODE 636 W/ 250 OVERRIDE (IP): Performed by: NURSE PRACTITIONER

## 2019-01-22 RX ORDER — IBUPROFEN 600 MG/1
600 TABLET ORAL EVERY 6 HOURS PRN
Status: DISCONTINUED | OUTPATIENT
Start: 2019-01-22 | End: 2019-01-23 | Stop reason: HOSPADM

## 2019-01-22 RX ORDER — HYDROCODONE BITARTRATE AND ACETAMINOPHEN 10; 325 MG/1; MG/1
1 TABLET ORAL EVERY 4 HOURS PRN
Status: DISCONTINUED | OUTPATIENT
Start: 2019-01-22 | End: 2019-01-23 | Stop reason: HOSPADM

## 2019-01-22 RX ORDER — SODIUM CHLORIDE, SODIUM LACTATE, POTASSIUM CHLORIDE, CALCIUM CHLORIDE 600; 310; 30; 20 MG/100ML; MG/100ML; MG/100ML; MG/100ML
INJECTION, SOLUTION INTRAVENOUS CONTINUOUS
Status: DISCONTINUED | OUTPATIENT
Start: 2019-01-22 | End: 2019-01-22

## 2019-01-22 RX ORDER — OXYTOCIN 10 [USP'U]/ML
10 INJECTION, SOLUTION INTRAMUSCULAR; INTRAVENOUS ONCE
Status: COMPLETED | OUTPATIENT
Start: 2019-01-22 | End: 2019-01-22

## 2019-01-22 RX ORDER — ONDANSETRON 2 MG/ML
4 INJECTION INTRAMUSCULAR; INTRAVENOUS EVERY 6 HOURS PRN
Status: DISCONTINUED | OUTPATIENT
Start: 2019-01-22 | End: 2019-01-23 | Stop reason: HOSPADM

## 2019-01-22 RX ORDER — OXYTOCIN 10 [USP'U]/ML
10 INJECTION, SOLUTION INTRAMUSCULAR; INTRAVENOUS
Status: DISCONTINUED | OUTPATIENT
Start: 2019-01-22 | End: 2019-01-22 | Stop reason: HOSPADM

## 2019-01-22 RX ORDER — METOCLOPRAMIDE HYDROCHLORIDE 5 MG/ML
10 INJECTION INTRAMUSCULAR; INTRAVENOUS EVERY 6 HOURS PRN
Status: DISCONTINUED | OUTPATIENT
Start: 2019-01-22 | End: 2019-01-23 | Stop reason: HOSPADM

## 2019-01-22 RX ORDER — CITRIC ACID/SODIUM CITRATE 334-500MG
30 SOLUTION, ORAL ORAL EVERY 6 HOURS PRN
Status: DISCONTINUED | OUTPATIENT
Start: 2019-01-22 | End: 2019-01-22 | Stop reason: HOSPADM

## 2019-01-22 RX ORDER — TERBUTALINE SULFATE 1 MG/ML
0.25 INJECTION, SOLUTION SUBCUTANEOUS PRN
Status: DISCONTINUED | OUTPATIENT
Start: 2019-01-22 | End: 2019-01-22 | Stop reason: HOSPADM

## 2019-01-22 RX ORDER — MISOPROSTOL 200 UG/1
800 TABLET ORAL
Status: DISCONTINUED | OUTPATIENT
Start: 2019-01-22 | End: 2019-01-22 | Stop reason: HOSPADM

## 2019-01-22 RX ORDER — METHYLERGONOVINE MALEATE 0.2 MG/ML
0.2 INJECTION INTRAVENOUS
Status: DISCONTINUED | OUTPATIENT
Start: 2019-01-22 | End: 2019-01-22 | Stop reason: HOSPADM

## 2019-01-22 RX ORDER — VITAMIN A ACETATE, BETA CAROTENE, ASCORBIC ACID, CHOLECALCIFEROL, .ALPHA.-TOCOPHEROL ACETATE, DL-, THIAMINE MONONITRATE, RIBOFLAVIN, NIACINAMIDE, PYRIDOXINE HYDROCHLORIDE, FOLIC ACID, CYANOCOBALAMIN, CALCIUM CARBONATE, FERROUS FUMARATE, ZINC OXIDE, CUPRIC OXIDE 3080; 12; 120; 400; 1; 1.84; 3; 20; 22; 920; 25; 200; 27; 10; 2 [IU]/1; UG/1; MG/1; [IU]/1; MG/1; MG/1; MG/1; MG/1; MG/1; [IU]/1; MG/1; MG/1; MG/1; MG/1; MG/1
1 TABLET, FILM COATED ORAL EVERY MORNING
Status: DISCONTINUED | OUTPATIENT
Start: 2019-01-23 | End: 2019-01-23 | Stop reason: HOSPADM

## 2019-01-22 RX ORDER — ACETAMINOPHEN 325 MG/1
325 TABLET ORAL EVERY 4 HOURS PRN
Status: DISCONTINUED | OUTPATIENT
Start: 2019-01-22 | End: 2019-01-22

## 2019-01-22 RX ORDER — HYDROXYZINE 50 MG/1
50 TABLET, FILM COATED ORAL EVERY 6 HOURS PRN
Status: DISCONTINUED | OUTPATIENT
Start: 2019-01-22 | End: 2019-01-22 | Stop reason: HOSPADM

## 2019-01-22 RX ORDER — HYDROCODONE BITARTRATE AND ACETAMINOPHEN 5; 325 MG/1; MG/1
1 TABLET ORAL EVERY 4 HOURS PRN
Status: DISCONTINUED | OUTPATIENT
Start: 2019-01-22 | End: 2019-01-23 | Stop reason: HOSPADM

## 2019-01-22 RX ORDER — CARBOPROST TROMETHAMINE 250 UG/ML
250 INJECTION, SOLUTION INTRAMUSCULAR
Status: DISCONTINUED | OUTPATIENT
Start: 2019-01-22 | End: 2019-01-22 | Stop reason: HOSPADM

## 2019-01-22 RX ORDER — BISACODYL 10 MG
10 SUPPOSITORY, RECTAL RECTAL PRN
Status: DISCONTINUED | OUTPATIENT
Start: 2019-01-22 | End: 2019-01-23 | Stop reason: HOSPADM

## 2019-01-22 RX ORDER — SODIUM CHLORIDE, SODIUM LACTATE, POTASSIUM CHLORIDE, CALCIUM CHLORIDE 600; 310; 30; 20 MG/100ML; MG/100ML; MG/100ML; MG/100ML
1000 INJECTION, SOLUTION INTRAVENOUS CONTINUOUS
Status: DISCONTINUED | OUTPATIENT
Start: 2019-01-22 | End: 2019-01-23 | Stop reason: HOSPADM

## 2019-01-22 RX ORDER — ONDANSETRON 4 MG/1
4 TABLET, ORALLY DISINTEGRATING ORAL EVERY 6 HOURS PRN
Status: DISCONTINUED | OUTPATIENT
Start: 2019-01-22 | End: 2019-01-23 | Stop reason: HOSPADM

## 2019-01-22 RX ORDER — IBUPROFEN 800 MG/1
800 TABLET ORAL EVERY 8 HOURS PRN
Status: DISCONTINUED | OUTPATIENT
Start: 2019-01-22 | End: 2019-01-22

## 2019-01-22 RX ORDER — ACETAMINOPHEN 325 MG/1
650 TABLET ORAL EVERY 4 HOURS PRN
Status: DISCONTINUED | OUTPATIENT
Start: 2019-01-22 | End: 2019-01-23 | Stop reason: HOSPADM

## 2019-01-22 RX ORDER — DOCUSATE SODIUM 100 MG/1
100 CAPSULE, LIQUID FILLED ORAL 2 TIMES DAILY PRN
Status: DISCONTINUED | OUTPATIENT
Start: 2019-01-22 | End: 2019-01-23 | Stop reason: HOSPADM

## 2019-01-22 RX ORDER — ALUMINA, MAGNESIA, AND SIMETHICONE 2400; 2400; 240 MG/30ML; MG/30ML; MG/30ML
30 SUSPENSION ORAL EVERY 6 HOURS PRN
Status: DISCONTINUED | OUTPATIENT
Start: 2019-01-22 | End: 2019-01-22 | Stop reason: HOSPADM

## 2019-01-22 RX ORDER — SODIUM CHLORIDE, SODIUM LACTATE, POTASSIUM CHLORIDE, CALCIUM CHLORIDE 600; 310; 30; 20 MG/100ML; MG/100ML; MG/100ML; MG/100ML
INJECTION, SOLUTION INTRAVENOUS
Status: COMPLETED
Start: 2019-01-22 | End: 2019-01-22

## 2019-01-22 RX ORDER — SODIUM CHLORIDE, SODIUM LACTATE, POTASSIUM CHLORIDE, CALCIUM CHLORIDE 600; 310; 30; 20 MG/100ML; MG/100ML; MG/100ML; MG/100ML
INJECTION, SOLUTION INTRAVENOUS PRN
Status: DISCONTINUED | OUTPATIENT
Start: 2019-01-22 | End: 2019-01-23 | Stop reason: HOSPADM

## 2019-01-22 RX ADMIN — SODIUM CHLORIDE, POTASSIUM CHLORIDE, SODIUM LACTATE AND CALCIUM CHLORIDE 125 ML: 600; 310; 30; 20 INJECTION, SOLUTION INTRAVENOUS at 11:21

## 2019-01-22 RX ADMIN — SODIUM CHLORIDE, SODIUM LACTATE, POTASSIUM CHLORIDE, CALCIUM CHLORIDE 125 ML: 600; 310; 30; 20 INJECTION, SOLUTION INTRAVENOUS at 11:21

## 2019-01-22 RX ADMIN — Medication 2000 ML/HR: at 18:41

## 2019-01-22 RX ADMIN — OXYTOCIN 10 UNITS: 10 INJECTION, SOLUTION INTRAMUSCULAR; INTRAVENOUS at 21:42

## 2019-01-22 RX ADMIN — Medication 125 ML/HR: at 19:23

## 2019-01-22 RX ADMIN — Medication 2 MILLI-UNITS/MIN: at 11:20

## 2019-01-22 ASSESSMENT — PATIENT HEALTH QUESTIONNAIRE - PHQ9
2. FEELING DOWN, DEPRESSED, IRRITABLE, OR HOPELESS: NOT AT ALL
1. LITTLE INTEREST OR PLEASURE IN DOING THINGS: NOT AT ALL
SUM OF ALL RESPONSES TO PHQ9 QUESTIONS 1 AND 2: 0

## 2019-01-22 ASSESSMENT — LIFESTYLE VARIABLES
ALCOHOL_USE: NO
EVER_SMOKED: NEVER

## 2019-01-22 ASSESSMENT — PAIN SCALES - GENERAL: PAINLEVEL_OUTOF10: 0

## 2019-01-22 NOTE — PROGRESS NOTES
"0910: Patient presents to L&D for IOL. Patient denies LOF, VB, UC's. Patient states + FM. FOB \"Justin\" at bedside. All questions answered at this time.  1110: MD out of C/S at this time. Md updated on patient at this time. Orders placed.   1832: RN calls MD and transition to bedside for delivery.  1838: Delivery of a viable male infant. APGARS 8/9.  1841: Delivery of intact placenta. 3 VC.  1850: Report given to GRECIA Wall RN. Plan of care discussed.   "

## 2019-01-22 NOTE — CARE PLAN
Problem: Safety  Goal: Free from accidental injury  Outcome: PROGRESSING AS EXPECTED  Patient resting in bed with call light in reach. Bed in low position. Progressing as expected.     Problem: Risk for Infection, Impaired Wound Healing  Goal: Remain free from signs and symptoms of infection  Outcome: PROGRESSING AS EXPECTED  No S/S of infection present at this time. Will continue to monitor. Progressing as expected.

## 2019-01-22 NOTE — H&P
History and Physical      Aide England is a 33 y.o. year old female  at 40w3d who presents for IOL secondary to post-dates and GDMA1.      Subjective:   negative  For CTXS.   negative Feels pain   negative for LOF  negative for vaginal bleeding.   positive for fetal movement    ROS: A comprehensive review of systems was negative.    Past Medical History:   Diagnosis Date   • Abnormal AFP-increased risk of Down's 2015   • Diabetes (HCC)     gdm   • Headache(784.0)    • Miscarriage 2014     History reviewed. No pertinent surgical history.  OB History    Para Term  AB Living   4 2 2 0 1 2   SAB TAB Ectopic Molar Multiple Live Births   1 0 0     2      # Outcome Date GA Lbr Esdras/2nd Weight Sex Delivery Anes PTL Lv   4 Current            3 Term 16 40w4d  2.995 kg (6 lb 9.6 oz) M Vag-Spont None N MARE   2 Term 08/27/15 40w0d  3.175 kg (7 lb) F Vag-Spont   MARE      Birth Comments: GDM Class A1    1 SAB 2014 7w0d             Birth Comments: NO D&C needed        Social History     Social History   • Marital status:      Spouse name: N/A   • Number of children: N/A   • Years of education: N/A     Occupational History   • Not on file.     Social History Main Topics   • Smoking status: Never Smoker   • Smokeless tobacco: Never Used   • Alcohol use No   • Drug use: No   • Sexual activity: Yes     Partners: Male      Comment: Planned pregnancy     Other Topics Concern   • Not on file     Social History Narrative    ** Merged History Encounter **          Allergies: Vancomycin and Pcn [penicillins]    Current Facility-Administered Medications:   •  oxytocin (PITOCIN) 20 UNITS/1000ML LR, , , ,   •  LACTATED RINGERS IV SOLN, , , ,     Prenatal care with [facility] starting at 18wks with following problems:  Patient Active Problem List    Diagnosis Date Noted   • High-risk pregnancy supervision, third trimester 2018   • Gestational diabetes mellitus (GDM),  antepartum 12/11/2018   • Encounter for supervision of other normal pregnancy, third trimester 10/12/2018       Objective:      Blood pressure 108/64, pulse 93, temperature 36.8 °C (98.3 °F), temperature source Temporal, height 1.524 m (5'), weight 64.4 kg (142 lb), last menstrual period 04/14/2018, unknown if currently breastfeeding.    General:   no acute distress, alert, cooperative   Skin:   normal   HEENT:  PERRLA   Lungs:   CTA bilateral   Heart:   S1, S2 normal, no murmur, click, rub or gallop, regular rate and rhythm, brisk carotid upstroke without bruits, peripheral pulses very brisk, chest is clear without rales or wheezing, no pedal edema, no JVD, no hepatosplenomegaly   Abdomen:   gravid, NT   EFW:  3200 g   Pelvis:  Exam deferred., proven to 3100 g   FHTs: 130 BPM + accels - decels mod variability   Contractions: 2 contractions in 10 min mild to palpation   Uterine Size: S=D   Presentations: Cephalic per RN   Cervix: Per RN    Dilation: 2cm    Effacement: 70    Station:  -2    Consistency: Medium    Position: Middle     Complete OB US  10/30/2018 10:32 AM    HISTORY/REASON FOR EXAM:  Evaluate fetal anatomy    TECHNIQUE/EXAM DESCRIPTION: OB complete ultrasound.    COMPARISON:  None    FINDINGS:  Fetal Lie:  Vertex  LMP:  4/14/2018  Clinical MELANIE by LMP:  1/19/2019    Placenta (Location):  Anterior  Placenta Previa: No  Placental Grade:    Amniotic Fluid Volume:  ERICK = 18.2 cm    Fetal Heart Rate:  135 bpm    Cervical Length:  4.23 cm transabdominal    No maternal adnexal mass is identified.    Umbilical Artery S/D Ratio(s):  Not applicable    Fetal Anatomy  (Seen or Not Seen)  Lateral Ventricles     Seen  Cisterna Magna        Seen  Cerebellum              Seen  CSP             Seen  Orbits             Seen  Face/Lips                Seen  Cord Insertion         Seen  Placental CI         Seen  4 Chamber Heart     Seen  LVOT               Seen  RVOT              Seen  3 Vessel View     Seen  Stomach        Seen  Kidneys                   Seen  Urinary Bladder      Seen  Spine                       Seen  3 Vessel Cord          Seen  Both Upper Extremities    Seen  Both Lower Extremities    Seen  Diaphragm             Seen  Movement       Seen  Gender:  Likely male    Fetal Biometry  BPD    7.23 cm, 29w 0d  HC    26.60 cm, 29w 0d  AC    24.23 cm, 28w 3d  Femur Length    5.21 cm, 27w 5d  Humerus Length    4.82 cm, 28w 2d  Cerebellum Diameter   3.35 cm    EGA by this US:  28w 3d  MELANIE by this US: 1/19/2019  MELANIE by 1st US:  Not applicable    Estimated Fetal Weight:  1211 g       Impression       Single intrauterine pregnancy of an estimated gestational age of 28w 3d with an estimated date of delivery of 1/19/2019.    Fetal survey within normal limits.         Lab Review  Lab:   Blood type: O     Recent Results (from the past 5880 hour(s))   POCT Pregnancy    Collection Time: 05/24/18  9:05 AM   Result Value Ref Range    POC Urine Pregnancy Test Positive Negative    Internal Control Positive Positive     Internal Control Negative Negative    THINPREP PAP W/HPV AND CTNG    Collection Time: 08/21/18  2:48 PM   Result Value Ref Range    Cytology Reg See Path Report     Source Cervical     Source Cervical     HPV Genotype 16 Negative Negative    HPV Genotype 18 Negative Negative    HPV Other High Risk Genotypes Negative Negative    C. trachomatis by PCR Negative Negative    N. gonorrhoeae by PCR Negative Negative   PRENATAL PANEL 3+HIV+UACXI    Collection Time: 10/02/18  1:57 PM   Result Value Ref Range    Color Yellow     Character Cloudy (A)     Specific Gravity 1.014 <1.035    Ph 6.5 5.0 - 8.0    Glucose Negative Negative mg/dL    Ketones 15 (A) Negative mg/dL    Protein Negative Negative mg/dL    Bilirubin Negative Negative    Urobilinogen, Urine 0.2 Negative    Nitrite Negative Negative    Leukocyte Esterase Small (A) Negative    Occult Blood Negative Negative    Micro Urine Req Microscopic     WBC 9.0 4.8 - 10.8 K/uL     RBC 4.03 (L) 4.20 - 5.40 M/uL    Hemoglobin 13.1 12.0 - 16.0 g/dL    Hematocrit 39.5 37.0 - 47.0 %    MCV 98.0 (H) 81.4 - 97.8 fL    MCH 32.5 27.0 - 33.0 pg    MCHC 33.2 (L) 33.6 - 35.0 g/dL    RDW 45.3 35.9 - 50.0 fL    Platelet Count 241 164 - 446 K/uL    MPV 10.4 9.0 - 12.9 fL    Neutrophils-Polys 69.10 44.00 - 72.00 %    Lymphocytes 22.20 22.00 - 41.00 %    Monocytes 7.50 0.00 - 13.40 %    Eosinophils 0.30 0.00 - 6.90 %    Basophils 0.30 0.00 - 1.80 %    Immature Granulocytes 0.60 0.00 - 0.90 %    Nucleated RBC 0.00 /100 WBC    Neutrophils (Absolute) 6.19 2.00 - 7.15 K/uL    Lymphs (Absolute) 1.99 1.00 - 4.80 K/uL    Monos (Absolute) 0.67 0.00 - 0.85 K/uL    Eos (Absolute) 0.03 0.00 - 0.51 K/uL    Baso (Absolute) 0.03 0.00 - 0.12 K/uL    Immature Granulocytes (abs) 0.05 0.00 - 0.11 K/uL    NRBC (Absolute) 0.00 K/uL    Rubella IgG Antibody 461.50 IU/mL    Syphilis, Treponemal Qual Non Reactive Non Reactive    Hepatitis B Surface Antigen Negative Negative   AFP TETRA    Collection Time: 10/02/18  1:57 PM   Result Value Ref Range    AFP Value -Eia 244 ng/mL    AFP MOM Value 2.04     Ue3 Value 4.52 ng/mL    Ue3 Mom 1.25     Patient's hCG, 2nd Trimester 80542 IU/L    hCG MoM, 2nd Trimester 0.58     Mckenzie Value -Eia 188 pg/mL    Mckenzie Mom Value 0.62     Interpretation Screen Neg     M[age]ELANIE [age] yr    Maternal Weight 132.0 lbs.     Gest. Age on Collection Date 24 wks, 0 days     Gestational Age Based On Ultrasound     Multiple Pregnancy Pagan     Race Nonblack     Insulin Dependent Diabetes No     Smoking No     Family Hx NTD No     Family Hx of Aneuploidy No     Specimen See Note     EER Quad, Maternal Serum See Note    HIV AG/AB COMBO ASSAY SCREENING    Collection Time: 10/02/18  1:57 PM   Result Value Ref Range    HIV Ag/Ab Combo Assay Non Reactive Non Reactive   URINE MICROSCOPIC (W/UA)    Collection Time: 10/02/18  1:57 PM   Result Value Ref Range    WBC 2-5 /hpf    RBC 0-2 /hpf    Bacteria Few (A)  None /hpf    Epithelial Cells Moderate (A) /hpf    Hyaline Cast 0-2 /lpf   OP PRENATAL PANEL-BLOOD BANK    Collection Time: 10/02/18  2:08 PM   Result Value Ref Range    ABO Grouping Only O     Rh Grouping Only POS     Antibody Screen Scrn NEG    GLUCOSE 1HR GESTATIONAL    Collection Time: 10/30/18  9:50 AM   Result Value Ref Range    Glucose, Post Dose 174 (H) 70 - 139 mg/dL   CBC WITH DIFFERENTIAL    Collection Time: 10/30/18  9:50 AM   Result Value Ref Range    WBC 7.7 4.8 - 10.8 K/uL    RBC 3.70 (L) 4.20 - 5.40 M/uL    Hemoglobin 12.1 12.0 - 16.0 g/dL    Hematocrit 36.4 (L) 37.0 - 47.0 %    MCV 98.4 (H) 81.4 - 97.8 fL    MCH 32.7 27.0 - 33.0 pg    MCHC 33.2 (L) 33.6 - 35.0 g/dL    RDW 45.6 35.9 - 50.0 fL    Platelet Count 219 164 - 446 K/uL    MPV 10.3 9.0 - 12.9 fL    Neutrophils-Polys 73.70 (H) 44.00 - 72.00 %    Lymphocytes 18.30 (L) 22.00 - 41.00 %    Monocytes 7.00 0.00 - 13.40 %    Eosinophils 0.30 0.00 - 6.90 %    Basophils 0.30 0.00 - 1.80 %    Immature Granulocytes 0.40 0.00 - 0.90 %    Nucleated RBC 0.00 /100 WBC    Neutrophils (Absolute) 5.70 2.00 - 7.15 K/uL    Lymphs (Absolute) 1.41 1.00 - 4.80 K/uL    Monos (Absolute) 0.54 0.00 - 0.85 K/uL    Eos (Absolute) 0.02 0.00 - 0.51 K/uL    Baso (Absolute) 0.02 0.00 - 0.12 K/uL    Immature Granulocytes (abs) 0.03 0.00 - 0.11 K/uL    NRBC (Absolute) 0.00 K/uL   T.PALLIDUM AB EIA    Collection Time: 10/30/18  9:50 AM   Result Value Ref Range    Syphilis, Treponemal Qual Non Reactive Non Reactive   GLUCOSE 3 HR GESTATIONAL    Collection Time: 12/07/18  6:46 AM   Result Value Ref Range    Glucose 3 Hour 150 (H) 65 - 140 mg/dL    Glucose 2 Hour 191 (H) 65 - 155 mg/dL    Baseline Glucose 67 65 - 95 mg/dL    Glucose 1 Hour 190 (H) 65 - 180 mg/dL   GRP B STREP, BY PCR (STOCKTON BROTH)    Collection Time: 12/26/18 10:42 AM   Result Value Ref Range    Strep Gp B DNA PCR Negative Negative   CBC WITH DIFFERENTIAL    Collection Time: 01/22/19  9:40 AM   Result Value Ref  Range    WBC 7.3 4.8 - 10.8 K/uL    RBC 4.14 (L) 4.20 - 5.40 M/uL    Hemoglobin 13.3 12.0 - 16.0 g/dL    Hematocrit 40.0 37.0 - 47.0 %    MCV 96.6 81.4 - 97.8 fL    MCH 32.1 27.0 - 33.0 pg    MCHC 33.3 (L) 33.6 - 35.0 g/dL    RDW 45.6 35.9 - 50.0 fL    Platelet Count 202 164 - 446 K/uL    MPV 10.8 9.0 - 12.9 fL    Neutrophils-Polys 72.20 (H) 44.00 - 72.00 %    Lymphocytes 19.10 (L) 22.00 - 41.00 %    Monocytes 8.00 0.00 - 13.40 %    Eosinophils 0.10 0.00 - 6.90 %    Basophils 0.30 0.00 - 1.80 %    Immature Granulocytes 0.30 0.00 - 0.90 %    Nucleated RBC 0.00 /100 WBC    Neutrophils (Absolute) 5.30 2.00 - 7.15 K/uL    Lymphs (Absolute) 1.40 1.00 - 4.80 K/uL    Monos (Absolute) 0.59 0.00 - 0.85 K/uL    Eos (Absolute) 0.01 0.00 - 0.51 K/uL    Baso (Absolute) 0.02 0.00 - 0.12 K/uL    Immature Granulocytes (abs) 0.02 0.00 - 0.11 K/uL    NRBC (Absolute) 0.00 K/uL   HOLD BLOOD BANK SPECIMEN (NOT TESTED)    Collection Time: 19  9:40 AM   Result Value Ref Range    Holding Tube - Bb DONE    ACCU-CHEK GLUCOSE    Collection Time: 19 10:21 AM   Result Value Ref Range    Glucose - Accu-Ck 68 65 - 99 mg/dL        Assessment:   1.  IUP at 40w3d  2.  Labor status: Not in labor.  3.  Cat 1 FHTs  4.  Obstetrical history significant for   Patient Active Problem List    Diagnosis Date Noted   • High-risk pregnancy supervision, third trimester 2018   • Gestational diabetes mellitus (GDM), antepartum 2018   • Encounter for supervision of other normal pregnancy, third trimester 10/12/2018   .      Plan:     Admit to L&D  GBS negative  Routine labs  Pain management prn - pt declines pain medication.  Pitocin IOL.   Accuchek q2hr.          OB Attending Addendum:    I have seen Ms. Aide Hernandez Alec England and agree w/ documentation by SHAJI Rubi.  Pt is a 33 y.o.yo  @ 40w3d with A1DM here for IOL.  Pt feeling well, increasing intensity of ctx.  Does not desire epidural.  EFW 3500g on my exam, FHT cat  I.  Anticipate AROM with next LIV Oliver MD  [facility], [facility]

## 2019-01-23 VITALS
BODY MASS INDEX: 27.88 KG/M2 | TEMPERATURE: 97.7 F | SYSTOLIC BLOOD PRESSURE: 96 MMHG | RESPIRATION RATE: 16 BRPM | OXYGEN SATURATION: 97 % | HEART RATE: 88 BPM | HEIGHT: 60 IN | DIASTOLIC BLOOD PRESSURE: 69 MMHG | WEIGHT: 142 LBS

## 2019-01-23 LAB
ERYTHROCYTE [DISTWIDTH] IN BLOOD BY AUTOMATED COUNT: 45.6 FL (ref 35.9–50)
HCT VFR BLD AUTO: 39.6 % (ref 37–47)
HGB BLD-MCNC: 13.4 G/DL (ref 12–16)
MCH RBC QN AUTO: 32.6 PG (ref 27–33)
MCHC RBC AUTO-ENTMCNC: 33.8 G/DL (ref 33.6–35)
MCV RBC AUTO: 96.4 FL (ref 81.4–97.8)
PLATELET # BLD AUTO: 175 K/UL (ref 164–446)
PMV BLD AUTO: 10.1 FL (ref 9–12.9)
RBC # BLD AUTO: 4.11 M/UL (ref 4.2–5.4)
WBC # BLD AUTO: 10.8 K/UL (ref 4.8–10.8)

## 2019-01-23 PROCEDURE — 85027 COMPLETE CBC AUTOMATED: CPT

## 2019-01-23 PROCEDURE — 36415 COLL VENOUS BLD VENIPUNCTURE: CPT

## 2019-01-23 ASSESSMENT — EDINBURGH POSTNATAL DEPRESSION SCALE (EPDS)
THINGS HAVE BEEN GETTING ON TOP OF ME: NO, I HAVE BEEN COPING AS WELL AS EVER
I HAVE BEEN ANXIOUS OR WORRIED FOR NO GOOD REASON: NO, NOT AT ALL
I HAVE BEEN SO UNHAPPY THAT I HAVE HAD DIFFICULTY SLEEPING: NOT AT ALL
I HAVE FELT SAD OR MISERABLE: NO, NOT AT ALL
THE THOUGHT OF HARMING MYSELF HAS OCCURRED TO ME: NEVER
I HAVE BLAMED MYSELF UNNECESSARILY WHEN THINGS WENT WRONG: NOT VERY OFTEN
I HAVE BEEN SO UNHAPPY THAT I HAVE BEEN CRYING: NO, NEVER
I HAVE BEEN ABLE TO LAUGH AND SEE THE FUNNY SIDE OF THINGS: AS MUCH AS I ALWAYS COULD
I HAVE FELT SCARED OR PANICKY FOR NO GOOD REASON: NO, NOT AT ALL
I HAVE LOOKED FORWARD WITH ENJOYMENT TO THINGS: AS MUCH AS I EVER DID

## 2019-01-23 NOTE — PROGRESS NOTES
Per SELINA Wall MD ordered IM pitocin but would still like IV placed in after swelling has gone down.

## 2019-01-23 NOTE — L&D DELIVERY NOTE
Vaginal Delivery Procedure Note:     Aide England is a 33 y.o. , now Para 3013 was admitted to Labor and Delivery at 40w3d for IOL for A1DM, desired induction.      PreDelivery Diagnosis:  1. SIUP @ 40w3d  2. A1GDM    PostDelivery Diagnosis:  1. S/p       Procedure in Detail:  Pt pushing dorsal lithotomy position.  Head delivered easily over intact perineum.  Anterior shoulder followed easily with gentle downwards pressure followed by the posterior shoulder and body.  Infant delivered @ 1838.  There was no nuchal cord.  Infant was placed on the maternal abdomen and was stimulated and bulb suctioned.  Cord clamping was delayed x60 seconds then the cord was clamped x2 and cut.  Infant APGARs 8 and 9 and 1 and 5 minutes, respectively.  Birth weight pending at time of note.  Placenta delivered spontaneously intact, normal in appearance with 3 Vessel cord.  The vagina and perineum were examined revealing no laceration.  The uterus was firm with IV pitocin and fundal massage.  Pt and infant left bonding in LDR.    EBL 300cc  Anesthesia - none  Sponge and needle counts correct.  Patient tolerated procedure well.    Anticipate routine postparum care.      Ev Oliver MD  [facility], [facility]

## 2019-01-23 NOTE — PROGRESS NOTES
1850) Bedside report received from DAMASO Schmidt RN, POC discussed at bedside, safety precautions in place, CLIP    1950) Patient ambulated to bathroom with steady gait, patient voided without difficulty, hong care provided.     2010) Patient transferred up to PP room 340 in stable condition, bedside report to Maricruz MARKS, POC discussed at bedside, bands verified with PP RN. RN notified concerning FSBS due on infant    2020) Patient's PIV infiltrated, MD made aware, orders received to administer IM pitocin and attempt to establish a line, report to Whit MARKS to update

## 2019-01-23 NOTE — DISCHARGE SUMMARY
Discharge Summary:      Aide England      Admit Date:   2019  Discharge Date:  2019     Admitting diagnosis:  Pregnancy. Admit for induction of labor secondary to A1GDM  Labor and delivery, indication for care  Discharge Diagnosis: Status post vaginal, spontaneous.  Pregnancy Complications: see below problem list  Tubal Ligation:  no        History:  Past Medical History:   Diagnosis Date   • Abnormal AFP-increased risk of Down's 2015   • Diabetes (HCC)     gdm   • Headache(784.0)    • Miscarriage 2014     OB History    Para Term  AB Living   4 2 2 0 1 2   SAB TAB Ectopic Molar Multiple Live Births   1 0 0     2      # Outcome Date GA Lbr Esdras/2nd Weight Sex Delivery Anes PTL Lv   4 Current            3 Term 16 40w4d  2.995 kg (6 lb 9.6 oz) M Vag-Spont None N MARE   2 Term 08/27/15 40w0d  3.175 kg (7 lb) F Vag-Spont   MARE      Birth Comments: GDM Class A1    1 SAB 2014 7w0d             Birth Comments: NO D&C needed           Vancomycin and Pcn [penicillins]  Patient Active Problem List    Diagnosis Date Noted   • Vaginal delivery 2019   • High-risk pregnancy supervision, third trimester 2018   • Gestational diabetes mellitus (GDM), antepartum 2018   • Encounter for supervision of other normal pregnancy, third trimester 10/12/2018        Hospital Course:   33 y.o. , now para 3, was admitted with the above mentioned diagnosis, underwent Induction of Labor, vaginal, spontaneous. Patient postpartum course was unremarkable, with progressive advancement in diet , ambulation and toleration of oral analgesia. Patient without complaints today and desires discharge.      Vitals:    19 1918 19 2000 19 0000 19 0400   BP: 109/59 (!) 92/54 (!) 94/53 101/71   Pulse: 80 68 89 76   Resp:  17 17    Temp: 37.2 °C (98.9 °F) 36.2 °C (97.1 °F) 36.9 °C (98.4 °F) 36.6 °C (97.9 °F)   TempSrc: Temporal Temporal Temporal Temporal    SpO2:  97% 96% 100%   Weight:       Height:           Current Facility-Administered Medications   Medication Dose   • oxytocin (PITOCIN) 20 UNITS/1000ML LR (induction of labor)  0.5-20 radha-units/min   • lactated ringers infusion  1,000 mL   • ondansetron (ZOFRAN ODT) dispertab 4 mg  4 mg    Or   • ondansetron (ZOFRAN) syringe/vial injection 4 mg  4 mg   • metoclopramide (REGLAN) injection 10 mg  10 mg   • oxytocin (PITOCIN) infusion (for postpartum)   mL/hr   • HYDROcodone-acetaminophen (NORCO) 5-325 MG per tablet 1 Tab  1 Tab   • HYDROcodone/acetaminophen (NORCO)  MG per tablet 1 Tab  1 Tab   • LR infusion     • docusate sodium (COLACE) capsule 100 mg  100 mg   • bisacodyl (DULCOLAX) suppository 10 mg  10 mg   • magnesium hydroxide (MILK OF MAGNESIA) suspension 30 mL  30 mL   • prenatal plus vitamin (STUARTNATAL 1+1) 27-1 MG tablet 1 Tab  1 Tab   • ibuprofen (MOTRIN) tablet 600 mg  600 mg   • acetaminophen (TYLENOL) tablet 650 mg  650 mg       Exam:  Breast Exam: negative  Abdomen: Abdomen soft, non-tender. BS normal. No masses,  No organomegaly  Fundus Non Tender: yes  Incision: none  Perineum: perineum intact  Extremity: extremities, peripheral pulses and reflexes normal     Labs:  Recent Labs      01/22/19   0940  01/23/19   0520   WBC  7.3  10.8   RBC  4.14*  4.11*   HEMOGLOBIN  13.3  13.4   HEMATOCRIT  40.0  39.6   MCV  96.6  96.4   MCH  32.1  32.6   MCHC  33.3*  33.8   RDW  45.6  45.6   PLATELETCT  202  175   MPV  10.8  10.1        Activity:   Discharge to home  Pelvic Rest x 6 weeks    Assessment:  normal postpartum course  Discharge Assessment: No areas of skin breakdown/redness; surgical incision intact/healing     Follow up: [facility] or [facility] in 5 weeks for vaginal ; 1 week for incision check.      Discharge Meds:   No current outpatient prescriptions on file.     Patient states no need for narcotic pain medication for home. Will follow up for contraception.     Norma SHELBY  CAROLINA Mead.

## 2019-01-23 NOTE — CONSULTS
"Met with MOB for an initial lactation visit.  MOB delivered her third child yesterday, 01/22/19, at 1838 at 40.6 weeks gestation.  Infant is approximately 14 hours old.  MOB stated breast fed her first child for 9-10 months and her second child for 1 year.  Assistance with breastfeeding offered, but MOB declined.  MOB stated infant is latching without difficulty and is feeding well at the breast.  MOB denied pain and tissue damage to nipples and areolas with latch.    Discussed what to expect with breastfeeding in the first 24-48 hours following delivery.    Provided MOB with \"Getting To Know Your Baby\" pamphlet and content reviewed.    MOB made aware of the outpatient lactation assistance available to her through the [facility].  Invited MOB to attend [facility] [place].    Breastfeeding Plan:  Feed infant on demand per feeding cues and within 3 hours from the last feed.  If infant unable to latch onto the breast between now and when infant turns 24 hours old, MOB instructed to hand express colostrum onto a spoon and feed back expressed breast milk to infant.      MOB verbalized understanding of all information provided to her and denied having any further questions at this time.  Encouraged MOB to call for lactation assistance as needed.      "

## 2019-01-23 NOTE — PROGRESS NOTES
Pt bonding with infant. Pt instructed to call for assessment of latch of infant. Call bell in reach. Assessment wnl.

## 2019-01-23 NOTE — PROGRESS NOTES
Patient admited to postpartum from L&D via wheelchair. IV infiltrated so discontinued by SELINA Wall who attempted on different site but unsuccessful due to edema. Armbands verified with FOB and infant. Fundus firm at umbilicus with scant to light lochia. VSS. Oriented to surroundings and use of S3Bubble system.   clear

## 2019-01-23 NOTE — CARE PLAN
Problem: Altered physiologic condition related to immediate post-delivery state and potential for bleeding/hemorrhage  Goal: Patient physiologically stable as evidenced by normal lochia, palpable uterine involution and vital signs within normal limits  Outcome: PROGRESSING AS EXPECTED  Fundus firm at umbilicus with light lochia.    Problem: Potential for postpartum infection related to presence of episiotomy/vaginal tear and/or uterine contamination  Goal: Patient will be absent from signs and symptoms of infection  Outcome: PROGRESSING AS EXPECTED  Vital signs are within acceptable limits.    Problem: Alteration in comfort related to episiotomy, vaginal repair and/or after birth pains  Goal: Patient is able to ambulate, care for self and infant  Outcome: PROGRESSING AS EXPECTED  Ambulating  and voiding without difficulty.  Goal: Patient verbalizes acceptable pain level  Outcome: PROGRESSING AS EXPECTED  Patient declines pain medication for discomfort and mild cramping. Instructed patient to call if needing pain relief.    Problem: Potential knowledge deficit related to lack of understanding of self and  care  Goal: Patient will demonstrate ability to care for self and infant  Outcome: PROGRESSING AS EXPECTED  Breastfeeding independently and bonding well with infant.

## 2019-03-14 ENCOUNTER — APPOINTMENT (OUTPATIENT)
Dept: OBGYN | Facility: CLINIC | Age: 34
End: 2019-03-14
Payer: COMMERCIAL

## 2021-02-06 ENCOUNTER — OFFICE VISIT (OUTPATIENT)
Dept: URGENT CARE | Facility: PHYSICIAN GROUP | Age: 36
End: 2021-02-06
Payer: COMMERCIAL

## 2021-02-06 VITALS
WEIGHT: 125 LBS | SYSTOLIC BLOOD PRESSURE: 108 MMHG | HEIGHT: 57 IN | DIASTOLIC BLOOD PRESSURE: 58 MMHG | OXYGEN SATURATION: 97 % | HEART RATE: 88 BPM | BODY MASS INDEX: 26.97 KG/M2 | TEMPERATURE: 99.2 F

## 2021-02-06 DIAGNOSIS — T23.102A SUPERFICIAL BURN OF LEFT HAND, UNSPECIFIED SITE OF HAND, INITIAL ENCOUNTER: ICD-10-CM

## 2021-02-06 DIAGNOSIS — T23.202A PARTIAL THICKNESS BURN OF LEFT HAND, UNSPECIFIED SITE OF HAND, INITIAL ENCOUNTER: ICD-10-CM

## 2021-02-06 PROCEDURE — 99204 OFFICE O/P NEW MOD 45 MIN: CPT | Performed by: FAMILY MEDICINE
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
## 2022-11-28 ENCOUNTER — APPOINTMENT (OUTPATIENT)
Dept: RADIOLOGY | Facility: IMAGING CENTER | Age: 37
End: 2022-11-28
Attending: NURSE PRACTITIONER
Payer: COMMERCIAL

## 2022-11-28 ENCOUNTER — OFFICE VISIT (OUTPATIENT)
Dept: URGENT CARE | Facility: PHYSICIAN GROUP | Age: 37
End: 2022-11-28
Payer: COMMERCIAL

## 2022-11-28 VITALS
BODY MASS INDEX: 23.16 KG/M2 | TEMPERATURE: 97.5 F | HEART RATE: 78 BPM | WEIGHT: 118 LBS | RESPIRATION RATE: 16 BRPM | HEIGHT: 60 IN | OXYGEN SATURATION: 100 % | DIASTOLIC BLOOD PRESSURE: 64 MMHG | SYSTOLIC BLOOD PRESSURE: 120 MMHG

## 2022-11-28 DIAGNOSIS — R06.02 SHORTNESS OF BREATH: ICD-10-CM

## 2022-11-28 LAB
INT CON NEG: NORMAL
INT CON POS: NORMAL
POC URINE PREGNANCY TEST: NORMAL

## 2022-11-28 PROCEDURE — 81025 URINE PREGNANCY TEST: CPT | Performed by: NURSE PRACTITIONER

## 2022-11-28 PROCEDURE — 71046 X-RAY EXAM CHEST 2 VIEWS: CPT | Mod: TC,FY | Performed by: NURSE PRACTITIONER

## 2022-11-28 PROCEDURE — 99213 OFFICE O/P EST LOW 20 MIN: CPT | Performed by: NURSE PRACTITIONER

## 2022-11-28 RX ORDER — ALBUTEROL SULFATE 90 UG/1
2 AEROSOL, METERED RESPIRATORY (INHALATION) EVERY 6 HOURS PRN
Qty: 8.5 G | Refills: 0 | Status: CANCELLED | OUTPATIENT
Start: 2022-11-28

## 2022-11-28 RX ORDER — ALBUTEROL SULFATE 90 UG/1
2 AEROSOL, METERED RESPIRATORY (INHALATION) EVERY 6 HOURS PRN
Qty: 8.5 G | Refills: 1 | Status: SHIPPED | OUTPATIENT
Start: 2022-11-28

## 2022-11-28 ASSESSMENT — ENCOUNTER SYMPTOMS
FEVER: 0
GASTROINTESTINAL NEGATIVE: 1
NEUROLOGICAL NEGATIVE: 1
CONSTITUTIONAL NEGATIVE: 1
EYES NEGATIVE: 1
SHORTNESS OF BREATH: 1
MUSCULOSKELETAL NEGATIVE: 1

## 2022-11-28 NOTE — LETTER
November 28, 2022       Patient: Aide England   YOB: 1985   Date of Visit: 11/28/2022         To Whom It May Concern:    In my medical opinion, I recommend that Aide England 11/28/2022 due to illness.     If you have any questions or concerns, please don't hesitate to call [phone]          Sincerely,          RALPH QuezadaRJacquelineN.  Electronically Signed

## 2022-11-28 NOTE — PROGRESS NOTES
Subjective:   Aide England is a 37 y.o. female who presents for Shortness of Breath (Last dx for Covid, having issues with lungs. Feels like she cannot fully expand lungs. Would like a chest x-ray. SOB comes and goes, yesterday had severe sob at work and was sent in. Needs excuse for work. )      And presents with chief complaint of shortness of breath and dizziness today.  Patient states that she had COVID approximately 1 year ago, and has been experiencing these symptoms since that time.  She states that she is having difficulty taking a deep breath due to pain in her chest and back.  She reports she thinks this is worsening over the past couple of days.  Yesterday she had an episode at work where she could not get her breath and did have to be transferred to the health clinic at work via wheelchair.  She states she eventually recovered and was sent home.  The clinic at work did suggest that she come in for evaluation today.  Patient does not have a PCP, therefore has not had any work-up for her shortness of breath performed over the past year.    Shortness of Breath  This is a new problem. The current episode started more than 1 year ago. The problem occurs constantly. The problem has been gradually worsening. Associated symptoms include chest pain. Pertinent negatives include no fever. Exacerbated by: mask at work. The patient has no known risk factors for DVT/PE. She has tried cool air, OTC cough suppressants and rest for the symptoms. The treatment provided no relief.     Review of Systems   Constitutional: Negative.  Negative for fever.   HENT: Negative.     Eyes: Negative.    Respiratory:  Positive for shortness of breath.    Cardiovascular:  Positive for chest pain.        Chest pain with inspiration   Gastrointestinal: Negative.    Genitourinary: Negative.    Musculoskeletal: Negative.    Skin: Negative.    Neurological: Negative.      Medications, Allergies, and current problem list  reviewed today in Epic.     Objective:     /64   Pulse 78   Temp 36.4 °C (97.5 °F) (Temporal)   Resp 16   Ht 1.524 m (5')   Wt 53.5 kg (118 lb)   SpO2 100%     Physical Exam  Vitals reviewed.   Constitutional:       Appearance: Normal appearance.   HENT:      Head: Normocephalic and atraumatic.      Nose: Nose normal.      Mouth/Throat:      Mouth: Mucous membranes are moist.      Pharynx: Oropharynx is clear.   Eyes:      Extraocular Movements: Extraocular movements intact.      Conjunctiva/sclera: Conjunctivae normal.      Pupils: Pupils are equal, round, and reactive to light.   Cardiovascular:      Rate and Rhythm: Normal rate and regular rhythm.   Pulmonary:      Effort: Pulmonary effort is normal.      Breath sounds: Normal breath sounds.   Abdominal:      General: Abdomen is flat.      Palpations: Abdomen is soft.   Musculoskeletal:         General: Normal range of motion.      Cervical back: Normal range of motion and neck supple.   Skin:     General: Skin is warm and dry.      Capillary Refill: Capillary refill takes less than 2 seconds.   Neurological:      General: No focal deficit present.      Mental Status: She is alert.   Psychiatric:         Mood and Affect: Mood normal.         Behavior: Behavior normal.       RADIOLOGY RESULTS   DX-CHEST-2 VIEWS    Result Date: 11/28/2022   Chest radiographs, 2 views. 11/28/2022 1:43 PM HISTORY/REASON FOR EXAM:  Shortness of Breath. COMPARISON:  None available. FINDINGS: The lungs are clear. No consolidation, edema, effusion, or pneumothorax is noted. Cardiac silhouette is within normal limits in size and unremarkable. Osseous structures and soft tissues are unremarkable.     No acute cardiopulmonary process.           Assessment/Plan:     Diagnosis and associated orders:     1. Shortness of breath  DX-CHEST-2 VIEWS    POCT Pregnancy    albuterol 108 (90 Base) MCG/ACT Aero Soln inhalation aerosol    Referral to establish with [facility] PCP    Referral to  Pulmonary and Sleep Medicine         Comments/MDM:     Reassured patient that her chest x-ray is negative at this time.  Her vital signs are clinically stable today, and she is 100% on room air.  I did give her an albuterol inhaler today to see if this improves her shortness of breath.  Due to the prolonged period that patient has had shortness of breath I do recommend patient see pulmonology for further pulmonary function testing, etc.  Patient does not have a PCP, and I placed a referral for her to establish with PCP as well as a referral to pulmonology for her today.  Patient will monitor her symptoms, and follow-up with urgent care/ER or PCP if her breathing does not improve or worsens.          Differential diagnosis, natural history, supportive care, and indications for immediate follow-up discussed.    Advised the patient to follow-up with the primary care physician for recheck, reevaluation, and consideration of further management.    Please note that this dictation was created using voice recognition software. I have made a reasonable attempt to correct obvious errors, but I expect that there are errors of grammar and possibly content that I did not discover before finalizing the note.    This note was electronically signed by JENNIFER Dolan

## 2022-12-05 ENCOUNTER — TELEPHONE (OUTPATIENT)
Dept: HEALTH INFORMATION MANAGEMENT | Facility: OTHER | Age: 37
End: 2022-12-05
Payer: COMMERCIAL

## 2023-08-20 ENCOUNTER — APPOINTMENT (OUTPATIENT)
Dept: URGENT CARE | Facility: PHYSICIAN GROUP | Age: 38
End: 2023-08-20
Payer: COMMERCIAL

## 2023-09-14 ENCOUNTER — GYNECOLOGY VISIT (OUTPATIENT)
Dept: OBGYN | Facility: CLINIC | Age: 38
End: 2023-09-14
Payer: COMMERCIAL

## 2023-09-14 ENCOUNTER — HOSPITAL ENCOUNTER (OUTPATIENT)
Facility: MEDICAL CENTER | Age: 38
End: 2023-09-14
Attending: FAMILY MEDICINE
Payer: COMMERCIAL

## 2023-09-14 VITALS — DIASTOLIC BLOOD PRESSURE: 71 MMHG | BODY MASS INDEX: 25.19 KG/M2 | SYSTOLIC BLOOD PRESSURE: 110 MMHG | WEIGHT: 129 LBS

## 2023-09-14 DIAGNOSIS — Z11.59 NEED FOR HEPATITIS C SCREENING TEST: ICD-10-CM

## 2023-09-14 DIAGNOSIS — Z11.3 ROUTINE SCREENING FOR STI (SEXUALLY TRANSMITTED INFECTION): ICD-10-CM

## 2023-09-14 DIAGNOSIS — Z31.69 ENCOUNTER FOR PRECONCEPTION CONSULTATION: ICD-10-CM

## 2023-09-14 DIAGNOSIS — Z11.59 SCREENING FOR MEASLES: ICD-10-CM

## 2023-09-14 DIAGNOSIS — Z11.59 NEED FOR HEPATITIS B SCREENING TEST: ICD-10-CM

## 2023-09-14 DIAGNOSIS — Z13.0 SCREENING FOR DEFICIENCY ANEMIA: ICD-10-CM

## 2023-09-14 DIAGNOSIS — Z13.29 THYROID DISORDER SCREENING: ICD-10-CM

## 2023-09-14 DIAGNOSIS — O03.9 SAB (SPONTANEOUS ABORTION): ICD-10-CM

## 2023-09-14 DIAGNOSIS — Z11.1 SCREENING-PULMONARY TB: ICD-10-CM

## 2023-09-14 DIAGNOSIS — Z12.4 CERVICAL CANCER SCREENING: ICD-10-CM

## 2023-09-14 PROBLEM — Z34.83 ENCOUNTER FOR SUPERVISION OF OTHER NORMAL PREGNANCY IN THIRD TRIMESTER: Status: RESOLVED | Noted: 2018-10-12 | Resolved: 2023-09-14

## 2023-09-14 PROBLEM — O24.419 GESTATIONAL DIABETES MELLITUS (GDM), ANTEPARTUM: Status: RESOLVED | Noted: 2018-12-11 | Resolved: 2023-09-14

## 2023-09-14 PROBLEM — O09.93 HIGH-RISK PREGNANCY SUPERVISION, THIRD TRIMESTER: Status: RESOLVED | Noted: 2018-12-26 | Resolved: 2023-09-14

## 2023-09-14 LAB
POCT INT CON NEG: NEGATIVE
POCT INT CON POS: POSITIVE
POCT URINE PREGNANCY TEST: NEGATIVE

## 2023-09-14 PROCEDURE — 87491 CHLMYD TRACH DNA AMP PROBE: CPT

## 2023-09-14 PROCEDURE — 3078F DIAST BP <80 MM HG: CPT | Performed by: FAMILY MEDICINE

## 2023-09-14 PROCEDURE — 87591 N.GONORRHOEAE DNA AMP PROB: CPT

## 2023-09-14 PROCEDURE — 99213 OFFICE O/P EST LOW 20 MIN: CPT | Performed by: FAMILY MEDICINE

## 2023-09-14 PROCEDURE — 88175 CYTOPATH C/V AUTO FLUID REDO: CPT

## 2023-09-14 PROCEDURE — 81025 URINE PREGNANCY TEST: CPT | Performed by: FAMILY MEDICINE

## 2023-09-14 PROCEDURE — 3074F SYST BP LT 130 MM HG: CPT | Performed by: FAMILY MEDICINE

## 2023-09-14 NOTE — PROGRESS NOTES
[facility]  Clinic Note    ID: Aide England is 37 y.o. here for SAB follow up.    Subjective     CC:   Chief Complaint   Patient presents with    Gynecologic Exam       HPI:   Pt is here for SAB follow up and pap.    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      ROS:  Gen: no fevers/chills, no changes in weight, tearful+  Eyes: no changes in vision  ENT: no sore throat, no hearing loss, no bloody nose  Pulm: no sob, no cough  CV: no chest pain, no palpitations  GI: no nausea/vomiting, no diarrhea  : no dysuria, normal menses, no longer bleeding  MSk: no myalgias  Skin: no rash  Neuro: no headaches, no numbness/tingling      Objective:     Exam:  /71 (BP Location: Right arm, Patient Position: Sitting, BP Cuff Size: Small adult)   Wt 58.5 kg (129 lb)   LMP 2023   Breastfeeding No   BMI 25.19 kg/m²     Gen: Alert and oriented, No apparent distress.  Neck: Neck is supple without lymphadenopathy.  Lungs: Normal effort, CTA bilaterally, no wheezes, rhonchi, or rales  CV: Regular rate and rhythm. No murmurs, rubs, or gallops.  Ext: No clubbing, cyanosis, edema.  : external genitalia normal. Vagina normal, cervix normal, no CMT, ovaries not palpable, external urethra normal, perineum normal    A chaperone was offered to the patient during today's exam. Chaperone name: Mia was present.      Problem   High-Risk Pregnancy Supervision, Third Trimester (Resolved)   Gestational Diabetes  Mellitus (Gdm), Antepartum (Resolved)   Encounter for supervision of other normal pregnancy, third trimester (Resolved)    IMO load 2020         Current Outpatient Medications Ordered in Epic   Medication Sig Dispense Refill    albuterol 108 (90 Base) MCG/ACT Aero Soln inhalation aerosol Inhale 2 Puffs every 6 hours as needed for Shortness of Breath. 8.5 g 1    mupirocin (BACTROBAN) 2 % Ointment APPLY TO WOUND 3 TIMES A DAY UNTIL HEALED. 22 g 5     No current [facility]-ordered facility-administered medications on file.       Assessment & Plan:     Aide England is 37 y.o. here for SAB follow up.    1. SAB (spontaneous )  - POCT Pregnancy    2. Encounter for preconception consultation  - CBC WITHOUT DIFFERENTIAL; Future  - TSH+FREE T4  - VITAMIN D,25 HYDROXY (DEFICIENCY); Future  - RUBEOLA IGG AB; Future  - VARICELLA ZOSTER IGG AB; Future    3. Cervical cancer screening  - THINPREP RFLX HPV ASCUS W/CTNG; Future    4. Routine screening for STI (sexually transmitted infection)  - HIV AG/AB COMBO ASSAY SCREENING; Future    5. Screening for deficiency anemia    6. Thyroid disorder screening    7. Screening for measles    8. Screening-pulmonary TB  - Quantiferon Gold TB (PPD); Future    9. Need for hepatitis B screening test  - HEP B SURFACE ANTIGEN; Future    10. Need for hepatitis C screening test  - HEP C VIRUS ANTIBODY; Future      #miscarriage/SAB, completed  - UPT neg today,   - has had normal menses in Sep      Return to clinic as needed.    Olya Pompa MD, MPH  [facility] / Obstetrics  [facility]

## 2023-09-15 DIAGNOSIS — Z12.4 CERVICAL CANCER SCREENING: ICD-10-CM

## 2023-09-17 LAB
C TRACH RRNA CVX QL NAA+PROBE: NEGATIVE
COMMENT NL11729A: NORMAL
CYTOLOGIST CVX/VAG CYTO: NORMAL
CYTOLOGY CVX/VAG DOC CYTO: NORMAL
CYTOLOGY CVX/VAG DOC THIN PREP: NORMAL
N GONORRHOEA RRNA CVX QL NAA+PROBE: NEGATIVE
NOTE NL11727A: NORMAL
OTHER STN SPEC: NORMAL
STAT OF ADQ CVX/VAG CYTO-IMP: NORMAL

## 2023-09-19 ENCOUNTER — HOSPITAL ENCOUNTER (OUTPATIENT)
Dept: LAB | Facility: MEDICAL CENTER | Age: 38
End: 2023-09-19
Attending: FAMILY MEDICINE
Payer: COMMERCIAL

## 2023-09-19 DIAGNOSIS — Z31.69 ENCOUNTER FOR PRECONCEPTION CONSULTATION: ICD-10-CM

## 2023-09-19 DIAGNOSIS — Z11.3 ROUTINE SCREENING FOR STI (SEXUALLY TRANSMITTED INFECTION): ICD-10-CM

## 2023-09-19 DIAGNOSIS — Z11.59 NEED FOR HEPATITIS C SCREENING TEST: ICD-10-CM

## 2023-09-19 DIAGNOSIS — Z11.59 NEED FOR HEPATITIS B SCREENING TEST: ICD-10-CM

## 2023-09-19 DIAGNOSIS — Z11.1 SCREENING-PULMONARY TB: ICD-10-CM

## 2023-09-19 LAB
ERYTHROCYTE [DISTWIDTH] IN BLOOD BY AUTOMATED COUNT: 44.8 FL (ref 35.9–50)
HBV SURFACE AG SER QL: NORMAL
HCT VFR BLD AUTO: 42.1 % (ref 37–47)
HCV AB SER QL: NORMAL
HGB BLD-MCNC: 14.1 G/DL (ref 12–16)
HIV 1+2 AB+HIV1 P24 AG SERPL QL IA: NORMAL
MCH RBC QN AUTO: 31.8 PG (ref 27–33)
MCHC RBC AUTO-ENTMCNC: 33.5 G/DL (ref 32.2–35.5)
MCV RBC AUTO: 95 FL (ref 81.4–97.8)
PLATELET # BLD AUTO: 274 K/UL (ref 164–446)
PMV BLD AUTO: 10.4 FL (ref 9–12.9)
RBC # BLD AUTO: 4.43 M/UL (ref 4.2–5.4)
T4 FREE SERPL-MCNC: 1.19 NG/DL (ref 0.93–1.7)
TSH SERPL DL<=0.005 MIU/L-ACNC: 2.27 UIU/ML (ref 0.38–5.33)
WBC # BLD AUTO: 5.8 K/UL (ref 4.8–10.8)

## 2023-09-19 PROCEDURE — 86765 RUBEOLA ANTIBODY: CPT

## 2023-09-19 PROCEDURE — 86787 VARICELLA-ZOSTER ANTIBODY: CPT

## 2023-09-19 PROCEDURE — 87340 HEPATITIS B SURFACE AG IA: CPT

## 2023-09-19 PROCEDURE — 86803 HEPATITIS C AB TEST: CPT

## 2023-09-19 PROCEDURE — 87389 HIV-1 AG W/HIV-1&-2 AB AG IA: CPT

## 2023-09-19 PROCEDURE — 82306 VITAMIN D 25 HYDROXY: CPT

## 2023-09-19 PROCEDURE — 84443 ASSAY THYROID STIM HORMONE: CPT

## 2023-09-19 PROCEDURE — 85027 COMPLETE CBC AUTOMATED: CPT

## 2023-09-19 PROCEDURE — 36415 COLL VENOUS BLD VENIPUNCTURE: CPT

## 2023-09-19 PROCEDURE — 84439 ASSAY OF FREE THYROXINE: CPT

## 2023-09-19 PROCEDURE — 86480 TB TEST CELL IMMUN MEASURE: CPT

## 2023-09-20 LAB
25(OH)D3 SERPL-MCNC: 28 NG/ML (ref 30–100)
GAMMA INTERFERON BACKGROUND BLD IA-ACNC: 0.03 IU/ML
M TB IFN-G BLD-IMP: NEGATIVE
M TB IFN-G CD4+ BCKGRND COR BLD-ACNC: 0 IU/ML
MITOGEN IGNF BCKGRD COR BLD-ACNC: >10 IU/ML
QFT TB2 - NIL TBQ2: 0 IU/ML

## 2023-09-21 LAB
MEV IGG SER-ACNC: 58.9 AU/ML
VZV IGG SER IA-ACNC: 2421 IV

## 2023-09-26 DIAGNOSIS — Z11.59 NEED FOR HEPATITIS B SCREENING TEST: ICD-10-CM

## 2023-09-26 DIAGNOSIS — Z78.9 MEASLES, MUMPS, RUBELLA (MMR) VACCINATION STATUS UNKNOWN: ICD-10-CM

## 2025-07-03 ENCOUNTER — OFFICE VISIT (OUTPATIENT)
Dept: URGENT CARE | Facility: PHYSICIAN GROUP | Age: 40
End: 2025-07-03
Payer: COMMERCIAL

## 2025-07-03 ENCOUNTER — TELEPHONE (OUTPATIENT)
Dept: HEALTH INFORMATION MANAGEMENT | Facility: OTHER | Age: 40
End: 2025-07-03

## 2025-07-03 VITALS
HEART RATE: 87 BPM | TEMPERATURE: 97.3 F | HEIGHT: 60 IN | BODY MASS INDEX: 25.72 KG/M2 | OXYGEN SATURATION: 98 % | RESPIRATION RATE: 16 BRPM | WEIGHT: 131 LBS | SYSTOLIC BLOOD PRESSURE: 124 MMHG | DIASTOLIC BLOOD PRESSURE: 68 MMHG

## 2025-07-03 DIAGNOSIS — S49.92XA INJURY OF LEFT SHOULDER, INITIAL ENCOUNTER: Primary | ICD-10-CM

## 2025-07-03 DIAGNOSIS — M25.512 CHRONIC LEFT SHOULDER PAIN: ICD-10-CM

## 2025-07-03 DIAGNOSIS — G89.29 CHRONIC LEFT SHOULDER PAIN: ICD-10-CM

## 2025-07-03 PROCEDURE — 3074F SYST BP LT 130 MM HG: CPT

## 2025-07-03 PROCEDURE — 99214 OFFICE O/P EST MOD 30 MIN: CPT

## 2025-07-03 PROCEDURE — 3078F DIAST BP <80 MM HG: CPT

## 2025-07-03 ASSESSMENT — ENCOUNTER SYMPTOMS
FOCAL WEAKNESS: 0
VOMITING: 0
BACK PAIN: 0
CHILLS: 0
FEVER: 0
NAUSEA: 0
WEIGHT LOSS: 0
CONSTIPATION: 0
WEAKNESS: 0
HEADACHES: 0
DIAPHORESIS: 0
DIZZINESS: 0
FLANK PAIN: 0
ABDOMINAL PAIN: 0
TINGLING: 0

## 2025-07-03 NOTE — PROGRESS NOTES
Subjective:   Aide England is a 39 y.o. female who presents for Shoulder Pain (Trip and fall in 2022 shoulder inj. No w/c claim made then./Heavy lifting now. 2 months of pain has flared up. Left shoulder. Pain with just holding arm up. Limited ROM / )      HPI  Patient states 3 years ago she had an initial trip and fall with left shoulder injury. No workup at initial injury.     She presents today after heavy lifting at work since dec 2025. For the past 2 months her left shoulder pain has been constant.     She has tried massage and heat. She followed up with PT at her work clinic who gave her two exercises, applied KT tape, and gave her an ice pack.         Negative: numbness, tingling, recent trauma, hx of surgery or procedure to area, weakness      Review of Systems   Constitutional:  Negative for chills, diaphoresis, fever, malaise/fatigue and weight loss.   Gastrointestinal:  Negative for abdominal pain, constipation, nausea and vomiting.   Genitourinary:  Negative for dysuria, flank pain, frequency, hematuria and urgency.   Musculoskeletal:  Positive for joint pain (left shoulder pain). Negative for back pain.   Neurological:  Negative for dizziness, tingling, focal weakness, weakness and headaches.       Medical History:  Past Medical History[1]    Allergies:  Allergies[2]    Social history, surgical history, medications, and current problem list reviewed today in Epic.       Objective:     /68   Pulse 87   Temp 36.3 °C (97.3 °F) (Temporal)   Resp 16   Ht 1.524 m (5')   Wt 59.4 kg (131 lb)   SpO2 98%     Physical Exam  Vitals reviewed.   Constitutional:       General: She is not in acute distress.     Appearance: Normal appearance. She is not ill-appearing, toxic-appearing or diaphoretic.   Cardiovascular:      Rate and Rhythm: Normal rate.      Pulses: Normal pulses.   Pulmonary:      Effort: Pulmonary effort is normal.   Musculoskeletal:      Cervical back: Normal range of  motion.      Comments: Left Shoulder exam:  limited range of motion: abduction and adduction are limited, no deformity, no step-offs, sensory exam intact, motor exam of radial and median nerve intact. 2+ distal pulses, No erythema, No abrasion, no scars, No soft tissue swelling, acromioclavicular joint tender, Clavicle nontender, Shoulder nontender, cervical spine nontender, scapular spine non tender, greater tuberosity tender   Skin:     General: Skin is warm.      Capillary Refill: Capillary refill takes less than 2 seconds.   Neurological:      General: No focal deficit present.      Mental Status: She is alert and oriented to person, place, and time.   Psychiatric:         Mood and Affect: Mood normal.         Behavior: Behavior normal.         Assessment/Plan:       Diagnosis and associated orders:     1. Injury of left shoulder, initial encounter  Referral to Sports Medicine    Referral to Physical Therapy      2. Chronic left shoulder pain  Referral to Sports Medicine    Referral to Physical Therapy           Comments/MDM:   I personally reviewed prior external notes and test results pertinent to today's visit as well as additional imaging and testing completed in clinic today.     Very pleasant 39-year-old female presenting with complaints of left shoulder pain for two months. Limited ROM, tenderness over AC joint and greater tuberosity. Most likely tendinitis.  Referral to sports medicine, physical therapy placed.  Patient given printouts for shoulder rehab and encouraged to do eccentric exercises.  Work note provided for patient to ask employer to refrain from placing patient in work areas that require heavy lifting and especially to limit over the head lifting.  Instructed patient that work note was provided but as this is not a work comp case they may not adhere to her restrictions.  Patient is clinically stable at today's acute urgent care visit. Vital signs are normal and reassuring.  No acute distress  noted. Appropriate for outpatient management at this time. No red flag warnings noted.  Patient given strict instructions to follow up with emergency room if they develop any red flag warnings which were discussed in depth.  They verbalized understanding. Differential diagnosis, natural history, supportive care, and indications for immediate follow-up discussed. All questions answered. They agree with the plan of care.      Please note that this dictation was created using voice recognition software. I have made every reasonable attempt to correct obvious errors, but I expect that there are errors of grammar and possibly content that I did not discover before finalizing the note.              [1]   Past Medical History:  Diagnosis Date    Abnormal AFP-increased risk of Down's 02/23/2015    Diabetes (HCC)     gdm    Headache(784.0)     Miscarriage 05/2014    Vaginal delivery 01/23/2019   [2]   Allergies  Allergen Reactions    Vancomycin Rash and Nausea     Red Face, White cheeks     Pcn [Penicillins]

## 2025-07-03 NOTE — LETTER
July 3, 2025         Patient: Aide England   YOB: 1985   Date of Visit: 7/3/2025           To Whom it May Concern:    Aide England was seen in my clinic on 7/3/2025. She may return to work on 7/6/25 please allow her to work in an area that does not include lifting over 10lbs.    If you have any questions or concerns, please don't hesitate to call.        Sincerely,           CHANDAN Paredes.  Electronically Signed